# Patient Record
Sex: FEMALE | Race: WHITE | Employment: OTHER | URBAN - METROPOLITAN AREA
[De-identification: names, ages, dates, MRNs, and addresses within clinical notes are randomized per-mention and may not be internally consistent; named-entity substitution may affect disease eponyms.]

---

## 2017-02-06 ENCOUNTER — DOCUMENTATION ONLY (OUTPATIENT)
Dept: CARDIOLOGY | Facility: CLINIC | Age: 77
End: 2017-02-06

## 2017-02-10 ENCOUNTER — ALLIED HEALTH/NURSE VISIT (OUTPATIENT)
Dept: CARDIOLOGY | Facility: CLINIC | Age: 77
End: 2017-02-10
Payer: MEDICARE

## 2017-02-10 DIAGNOSIS — Z95.0 CARDIAC PACEMAKER IN SITU: Primary | ICD-10-CM

## 2017-02-10 PROCEDURE — 93296 REM INTERROG EVL PM/IDS: CPT | Performed by: INTERNAL MEDICINE

## 2017-02-10 PROCEDURE — 93294 REM INTERROG EVL PM/LDLS PM: CPT | Performed by: INTERNAL MEDICINE

## 2017-02-10 NOTE — PROGRESS NOTES
~ 90 day office teletrace ~  AP/VS at time of check. Magnet response WNL. F/U scheduled q 3 months. Ashlyn OVIEDO

## 2017-05-26 ENCOUNTER — ALLIED HEALTH/NURSE VISIT (OUTPATIENT)
Dept: CARDIOLOGY | Facility: CLINIC | Age: 77
End: 2017-05-26
Payer: MEDICARE

## 2017-05-26 DIAGNOSIS — Z95.0 CARDIAC PACEMAKER IN SITU: Primary | ICD-10-CM

## 2017-05-26 PROCEDURE — 93293 PM PHONE R-STRIP DEVICE EVAL: CPT | Performed by: INTERNAL MEDICINE

## 2017-05-26 NOTE — MR AVS SNAPSHOT
"              After Visit Summary   5/26/2017    Shadia Gil    MRN: [id number]           Patient Information     Date Of Birth          1940        Visit Information        Provider Department      5/26/2017 1:30 PM RODRIGUES TECH1 [facility]        Today's Diagnoses     Cardiac pacemaker in situ    -  1       Follow-ups after your visit        Your next 10 appointments already scheduled     Jun 01, 2017  2:45 PM CDT   Return Visit with Syeda Linda MD   [facility] ([facility])    [address] [place] [address] [address]  [facility] [id number] [phone]            Aug 31, 2017  1:30 PM CDT   Teletrace with RODRIGUES TECH2   [facility] ([facility])    [address] [place] [address] [address]  [facility] [id number] [phone]              Who to contact     If you have questions or need follow up information about today's clinic visit or your schedule please contact [facility] directly at [phone].  Normal or non-critical lab and imaging results will be communicated to you by Wanshenhart, letter or phone within 4 business days after the clinic has received the results. If you do not hear from us within 7 days, please contact the clinic through SkyGiraffet or phone. If you have a critical or abnormal lab result, we will notify you by phone as soon as possible.  Submit refill requests through ClassDojo or call your pharmacy and they will forward the refill request to us. Please allow 3 business days for your refill to be completed.          Additional Information About Your Visit        Wanshenhart Information     ClassDojo lets you send messages to your doctor, view your test results, renew your prescriptions, schedule appointments and more. To sign up, go to [email or web address] . Click on \"Log in\" on the left side of the screen, which will " "take you to the Welcome page. Then click on \"Sign up Now\" on the right side of the page.     You will be asked to enter the access code listed below, as well as some personal information. Please follow the directions to create your username and password.     Your access code is: [email or web address]  Expires: 2017  1:43 PM     Your access code will  in 90 days. If you need help or a new code, please call your [place] clinic or [phone].        Care EveryWhere ID     This is your Care EveryWhere ID. This could be used by other organizations to access your [place] medical records  [id number]         Blood Pressure from Last 3 Encounters:   16 132/70   16 136/62   16 128/84    Weight from Last 3 Encounters:   16 89.4 kg (197 lb)   16 90.6 kg (199 lb 12.8 oz)   03/10/16 93.8 kg (206 lb 12.8 oz)              We Performed the Following     PM PHONE R STRIP EVAL UP TO 90 DAYS ([id number])        Primary Care Provider Office Phone # Fax #    Adryan Martinez [phone]       [facility] [address] [place] [address]        Thank you!     Thank you for choosing [facility] [place]  for your care. Our goal is always to provide you with excellent care. Hearing back from our patients is one way we can continue to improve our services. Please take a few minutes to complete the written survey that you may receive in the mail after your visit with us. Thank you!             Your Updated Medication List - Protect others around you: Learn how to safely use, store and throw away your medicines at www.disposemymeds.org.          This list is accurate as of: 17  1:43 PM.  Always use your most recent med list.                   Brand Name Dispense Instructions for use    aspirin 81 MG tablet      Take 1 tablet by mouth daily.       cholecalciferol 1000 UNIT tablet    vitamin D    100 tablet    Take 1 tablet (1,000 Units) by " mouth daily       CoQ-10 200 MG Caps      Take 1 capsule by mouth daily Take at same time as statin cholesterol medications       lisinopril 10 MG tablet    PRINIVIL/ZESTRIL    180 tablet    Take 1 tablet (10 mg) by mouth 2 times daily       metoprolol 50 MG tablet    LOPRESSOR    180 tablet    Take 1 tablet (50 mg) by mouth 2 times daily       nitroglycerin 0.4 MG sublingual tablet    NITROSTAT    25 tablet    Place 1 tablet (0.4 mg) under the tongue every 5 minutes as needed for chest pain       pravastatin 80 MG tablet    PRAVACHOL    90 tablet    Take 1 tablet (80 mg) by mouth daily

## 2017-05-26 NOTE — PROGRESS NOTES
~ 90 day office teletrace ~  Appropriate AP/VS at time of check. Magnet response WNL. F/U scheduled q 3 months. Ashlyn OVIEDO

## 2017-09-08 ENCOUNTER — DOCUMENTATION ONLY (OUTPATIENT)
Dept: CARDIOLOGY | Facility: CLINIC | Age: 77
End: 2017-09-08

## 2017-09-08 NOTE — PROGRESS NOTES
Pt missed office teletrace on 8/31/17. Tried contacting pt via phone with no response. Mailed 1 week letter. Ashlyn OVIEDO

## 2017-10-07 DIAGNOSIS — I10 ESSENTIAL HYPERTENSION, BENIGN: ICD-10-CM

## 2017-10-07 DIAGNOSIS — I25.10 CORONARY ARTERY DISEASE INVOLVING NATIVE CORONARY ARTERY OF NATIVE HEART WITHOUT ANGINA PECTORIS: ICD-10-CM

## 2017-10-07 DIAGNOSIS — E78.5 HYPERLIPIDEMIA LDL GOAL <100: ICD-10-CM

## 2017-10-07 NOTE — TELEPHONE ENCOUNTER
metoprolol (LOPRESSOR) 50 MG tablet      Last Written Prescription Date: 9/28/16  Last Fill Quantity: 180, # refills: 3    Last Office Visit with [facility], [facility] or [facility] prescribing provider:  9/28/16   Future Office Visit:        BP Readings from Last 3 Encounters:   09/28/16 132/70   06/17/16 136/62   03/31/16 128/84         pravastatin (PRAVACHOL) 80 MG tablet     Last Written Prescription Date: 9/28/16  Last Fill Quantity: 90, # refills: 3  Last Office Visit with [facility], [facility] or [facility] prescribing provider: 9/28/16       Lab Results   Component Value Date    CHOL 157 09/21/2016     Lab Results   Component Value Date    HDL 60 09/21/2016     Lab Results   Component Value Date    LDL 78 09/21/2016     Lab Results   Component Value Date    TRIG 96 09/21/2016     Lab Results   Component Value Date    CHOLHDLRATIO 2.9 09/30/2015         lisinopril (PRINIVIL,ZESTRIL) 10 MG tablet      Last Written Prescription Date: 9/28/16  Last Fill Quantity: 180, # refills: 3  Last Office Visit with [facility], [facility] or [facility] prescribing provider: 9/28/16       Potassium   Date Value Ref Range Status   09/21/2016 4.3 3.4 - 5.3 mmol/L Final     Creatinine   Date Value Ref Range Status   09/21/2016 1.14 (H) 0.52 - 1.04 mg/dL Final     BP Readings from Last 3 Encounters:   09/28/16 132/70   06/17/16 136/62   03/31/16 128/84

## 2017-10-09 ENCOUNTER — RADIANT APPOINTMENT (OUTPATIENT)
Dept: MAMMOGRAPHY | Facility: CLINIC | Age: 77
End: 2017-10-09
Attending: INTERNAL MEDICINE
Payer: MEDICARE

## 2017-10-09 DIAGNOSIS — Z12.31 VISIT FOR SCREENING MAMMOGRAM: ICD-10-CM

## 2017-10-09 PROCEDURE — G0202 SCR MAMMO BI INCL CAD: HCPCS | Mod: TC

## 2017-10-10 RX ORDER — METOPROLOL TARTRATE 50 MG
TABLET ORAL
Qty: 60 TABLET | Refills: 0 | Status: SHIPPED | OUTPATIENT
Start: 2017-10-10 | End: 2017-11-09

## 2017-10-10 RX ORDER — LISINOPRIL 10 MG/1
TABLET ORAL
Qty: 60 TABLET | Refills: 0 | Status: SHIPPED | OUTPATIENT
Start: 2017-10-10 | End: 2017-11-09

## 2017-10-10 RX ORDER — PRAVASTATIN SODIUM 80 MG/1
TABLET ORAL
Qty: 30 TABLET | Refills: 0 | Status: SHIPPED | OUTPATIENT
Start: 2017-10-10 | End: 2017-11-22

## 2017-10-11 ENCOUNTER — OFFICE VISIT (OUTPATIENT)
Dept: URGENT CARE | Facility: URGENT CARE | Age: 77
End: 2017-10-11
Payer: MEDICARE

## 2017-10-11 VITALS
HEART RATE: 60 BPM | WEIGHT: 206.2 LBS | BODY MASS INDEX: 34.31 KG/M2 | OXYGEN SATURATION: 98 % | DIASTOLIC BLOOD PRESSURE: 77 MMHG | TEMPERATURE: 96.7 F | SYSTOLIC BLOOD PRESSURE: 140 MMHG

## 2017-10-11 DIAGNOSIS — R04.0 EPISTAXIS: Primary | ICD-10-CM

## 2017-10-11 PROCEDURE — 99213 OFFICE O/P EST LOW 20 MIN: CPT | Performed by: PHYSICIAN ASSISTANT

## 2017-10-11 NOTE — MR AVS SNAPSHOT
After Visit Summary   10/11/2017    Shadia Gil    MRN: [id number]           Patient Information     Date Of Birth          1940        Visit Information        Provider Department      10/11/2017 10:00 AM Sneha Tai PA-C [facility]        Today's Diagnoses     Epistaxis    -  1      Care Instructions    (R04.0) Epistaxis  (primary encounter diagnosis)  Comment: with underlying mild cold symptoms  Plan: sodium chloride (OCEAN) 0.65 % nasal spray        Use saline nasal spray intermittently throughout day to moisturize nasal passages.  Gently blow nose.      Use small amount of vaseline in nostrils at bedtime.     You may also try steam treatments: breathe in steam (from a safe distance) from boiling water for a few minutes.  Consider a humidifier for your home.     Follow up with primary clinic should your symptoms persist or worsen.              Follow-ups after your visit        Who to contact     If you have questions or need follow up information about today's clinic visit or your schedule please contact [facility] directly at [phone].  Normal or non-critical lab and imaging results will be communicated to you by imbookin (Pogby)hart, letter or phone within 4 business days after the clinic has received the results. If you do not hear from us within 7 days, please contact the clinic through Runnert or phone. If you have a critical or abnormal lab result, we will notify you by phone as soon as possible.  Submit refill requests through [employer] or call your pharmacy and they will forward the refill request to us. Please allow 3 business days for your refill to be completed.          Additional Information About Your Visit        imbookin (Pogby)hart Information     [employer] lets you send messages to your doctor, view your test results, renew your prescriptions, schedule appointments and more. To sign up, go to [email or web address] .  "Click on \"Log in\" on the left side of the screen, which will take you to the Welcome page. Then click on \"Sign up Now\" on the right side of the page.     You will be asked to enter the access code listed below, as well as some personal information. Please follow the directions to create your username and password.     Your access code is: [email or web address]  Expires: 2018 10:37 AM     Your access code will  in 90 days. If you need help or a new code, please call your [place] clinic or [phone].        Care EveryWhere ID     This is your Care EveryWhere ID. This could be used by other organizations to access your [place] medical records  [id number]        Your Vitals Were     Pulse Temperature Pulse Oximetry BMI (Body Mass Index)          60 96.7  F (35.9  C) (Oral) 98% 34.31 kg/m2         Blood Pressure from Last 3 Encounters:   10/11/17 140/77   16 132/70   16 136/62    Weight from Last 3 Encounters:   10/11/17 206 lb 3.2 oz (93.5 kg)   16 197 lb (89.4 kg)   16 199 lb 12.8 oz (90.6 kg)              Today, you had the following     No orders found for display         Today's Medication Changes          These changes are accurate as of: 10/11/17 10:37 AM.  If you have any questions, ask your nurse or doctor.               Start taking these medicines.        Dose/Directions    sodium chloride 0.65 % nasal spray   Commonly known as:  OCEAN   Used for:  Epistaxis   Started by:  Sneha Tai PA-C        Dose:  1 spray   Spray 1 spray into both nostrils daily as needed for congestion   Quantity:  1 Bottle   Refills:  0            Where to get your medicines      These medications were sent to [facility] [address] [address]     Phone:  [phone]     sodium chloride 0.65 % nasal spray                Primary Care Provider Office Phone # Fax #    Adryan Martinez [phone]    "    [address] [facility] [address]        Equal Access to Services     LILY SMALLS : Hadii aad ku hadrachaelneptali Somarcosali, waurmilada luqsarbjitha, qadakshata harpreetmikepineda hawkphilippineda, dorcas romeoepianne marie linares. So [facility] [phone].    ATENCIÓN: Si habla español, tiene a cavazos disposición servicios gratuitos de asistencia lingüística. Llame al [phone].    We comply with applicable federal civil rights laws and [place] laws. We do not discriminate on the basis of race, color, national origin, age, disability, sex, sexual orientation, or gender identity.            Thank you!     Thank you for choosing [facility]  for your care. Our goal is always to provide you with excellent care. Hearing back from our patients is one way we can continue to improve our services. Please take a few minutes to complete the written survey that you may receive in the mail after your visit with us. Thank you!             Your Updated Medication List - Protect others around you: Learn how to safely use, store and throw away your medicines at www.disposemymeds.org.          This list is accurate as of: 10/11/17 10:37 AM.  Always use your most recent med list.                   Brand Name Dispense Instructions for use Diagnosis    aspirin 81 MG tablet      Take 1 tablet by mouth daily.    CAD (coronary artery disease), Essential hypertension, benign       cholecalciferol 1000 UNIT tablet    vitamin D    100 tablet    Take 1 tablet (1,000 Units) by mouth daily    Vitamin D deficiency       CoQ-10 200 MG Caps      Take 1 capsule by mouth daily Take at same time as statin cholesterol medications    Hyperlipidemia LDL goal <100       lisinopril 10 MG tablet    PRINIVIL/ZESTRIL    60 tablet    TAKE ONE TABLET BY MOUTH TWICE A DAY    Essential hypertension, benign       metoprolol 50 MG tablet    LOPRESSOR    60 tablet    TAKE ONE TABLET BY MOUTH TWICE A DAY    Coronary artery disease involving native coronary artery  of native heart without angina pectoris, Essential hypertension, benign       nitroGLYcerin 0.4 MG sublingual tablet    NITROSTAT    25 tablet    Place 1 tablet (0.4 mg) under the tongue every 5 minutes as needed for chest pain    CAD (coronary artery disease)       pravastatin 80 MG tablet    PRAVACHOL    30 tablet    TAKE ONE TABLET BY MOUTH EVERY DAY    Coronary artery disease involving native coronary artery of native heart without angina pectoris, Hyperlipidemia LDL goal <100       sodium chloride 0.65 % nasal spray    OCEAN    1 Bottle    Spray 1 spray into both nostrils daily as needed for congestion    Epistaxis

## 2017-10-11 NOTE — PROGRESS NOTES
SUBJECTIVE:   Shadia Gil is a 77 year old female presenting with a chief complaint of   1) intermittent nasal bleeding for the past 2 weeks.    2) clear runny nose for 2 weeks.  Denies any sneezing or fevers.  Denies any cough.   Denies any fatigue or headaches.    Patient is otherwise in her usual state of health.     Past Medical History:   Diagnosis Date     CAD (coronary artery disease) 1/29/10    CABG x 4; LIMA to the LAD and vein graft to the OM, OM2 and RCA      Essential hypertension, benign      GERD (gastroesophageal reflux disease)      Hyperlipidemia LDL goal <100 7/11/11         Obesity (BMI 30-39.9) 2015    BMI 36.8     Vitamin D deficiency      Patient Active Problem List   Diagnosis     Advanced directives, counseling/discussion     Coronary artery disease involving native artery  (H)     Essential hypertension, benign     Hyperlipidemia LDL goal <100     GERD (gastroesophageal reflux disease)     Vitamin D deficiency     Bradycardia     Tachycardia-bradycardia (H)     Obesity (BMI 30-39.9)     Cholelithiasis     CAD (coronary artery disease)     Social History   Substance Use Topics     Smoking status: Never Smoker     Smokeless tobacco: Never Used     Alcohol use No       ROS:  CONSTITUTIONAL:NEGATIVE for fever, chills, change in weight  INTEGUMENTARY/SKIN: NEGATIVE for worrisome rashes, moles or lesions  ENT/MOUTH: as per HPI  RESP:NEGATIVE for significant cough or SOB  CV: NEGATIVE for chest pain, palpitations or peripheral edema  GI: NEGATIVE for nausea, abdominal pain, heartburn, or change in bowel habits  NEURO: NEGATIVE for weakness, dizziness or paresthesias    OBJECTIVE  :/77  Pulse 60  Temp 96.7  F (35.9  C) (Oral)  Wt 206 lb 3.2 oz (93.5 kg)  SpO2 98%  BMI 34.31 kg/m2  GENERAL APPEARANCE: healthy, alert and no distress  EYES: EOMI,  PERRL, conjunctiva clear  HENT: ear canals and TM's normal.  Nose with clear coryza and a few areas of scabbed blood on septum.   Mouth without ulcers, erythema or lesions  NECK: supple, nontender, no lymphadenopathy  NEURO: Normal strength and tone, sensory exam grossly normal,  normal speech and mentation  SKIN: no suspicious lesions or rashes    R04.0) Epistaxis  (primary encounter diagnosis)  Comment: with underlying mild cold symptoms  Plan: sodium chloride (OCEAN) 0.65 % nasal spray        Use saline nasal spray intermittently throughout day to moisturize nasal passages.  Gently blow nose.      Use small amount of vaseline in nostrils at bedtime.     You may also try steam treatments: breathe in steam (from a safe distance) from boiling water for a few minutes.  Consider a humidifier for your home.     Follow up with primary clinic should your symptoms persist or worsen.

## 2017-10-11 NOTE — NURSING NOTE
"Chief Complaint   Patient presents with     Nose Bleeds     Lt nose strill bleeding x about 2 weeks on and off       Initial /77  Pulse 60  Temp 96.7  F (35.9  C) (Oral)  Wt 206 lb 3.2 oz (93.5 kg)  SpO2 98%  BMI 34.31 kg/m2 Estimated body mass index is 34.31 kg/(m^2) as calculated from the following:    Height as of 9/28/16: 5' 5\" (1.651 m).    Weight as of this encounter: 206 lb 3.2 oz (93.5 kg).  Medication Reconciliation: complete    "

## 2017-10-11 NOTE — PATIENT INSTRUCTIONS
(R04.0) Epistaxis  (primary encounter diagnosis)  Comment: with underlying mild cold symptoms  Plan: sodium chloride (OCEAN) 0.65 % nasal spray        Use saline nasal spray intermittently throughout day to moisturize nasal passages.  Gently blow nose.      Use small amount of vaseline in nostrils at bedtime.     You may also try steam treatments: breathe in steam (from a safe distance) from boiling water for a few minutes.  Consider a humidifier for your home.     Follow up with primary clinic should your symptoms persist or worsen.

## 2017-10-16 ENCOUNTER — ALLIED HEALTH/NURSE VISIT (OUTPATIENT)
Dept: CARDIOLOGY | Facility: CLINIC | Age: 77
End: 2017-10-16
Payer: MEDICARE

## 2017-10-16 DIAGNOSIS — Z95.0 CARDIAC PACEMAKER IN SITU: Primary | ICD-10-CM

## 2017-10-16 PROCEDURE — 93296 REM INTERROG EVL PM/IDS: CPT | Performed by: INTERNAL MEDICINE

## 2017-10-16 PROCEDURE — 93294 REM INTERROG EVL PM/LDLS PM: CPT | Performed by: INTERNAL MEDICINE

## 2017-10-16 NOTE — PROGRESS NOTES
Medtronic Adapta (D) Remote PPM Device Check  AP: 91 % : <1 %  Mode: AAI<->DDD        Presenting Rhythm: AP/VS  Heart Rate: Rates mostly in the 60's per histogram  Sensing: Stable    Pacing Threshold: Stable    Impedance: Stable  Battery Status: 9.5-13.5 years  Atrial Arrhythmia: 8 brief mode switch episodes since last in office check on 10/17/16, no EGMs.  Ventricular Arrhythmia: 1 ventricular high rate ( dated on 12/20/16). EGM shows regular VS events with no onset or off set EGM, rates 170-185bpm. EF 55-60% (2010). Reviewed with BAHMAN Lowe.      Care Plan: F/u annual threshold in 3 months. Gave patient results over the phone. RanjitCVT

## 2017-10-16 NOTE — MR AVS SNAPSHOT
After Visit Summary   10/16/2017    Shadia Gil    MRN: [id number]           Patient Information     Date Of Birth          1940        Visit Information        Provider Department      10/16/2017 4:45 PM RODRIGUES TECH1 [facility]        Today's Diagnoses     Cardiac pacemaker in situ    -  1       Follow-ups after your visit        Your next 10 appointments already scheduled     Oct 16, 2017  4:45 PM CDT   Remote PPM Check with RODRIGUES TECH1   [facility] ([facility])    [address] [facility] [facility] [address] [address]  [facility] [id number] [phone]           This appointment is for a remote check of your pacemaker.  This is not an appointment at the office.            Ron 15, 2018  2:30 PM CST   Pacemaker Check with RODRIGUES DCRN   [facility] ([facility])    [address]  [facility] [id number] [phone]              Who to contact     If you have questions or need follow up information about today's clinic visit or your schedule please contact [facility] directly at [phone].  Normal or non-critical lab and imaging results will be communicated to you by LiveDatahart, letter or phone within 4 business days after the clinic has received the results. If you do not hear from us within 7 days, please contact the clinic through LiveDatahart or phone. If you have a critical or abnormal lab result, we will notify you by phone as soon as possible.  Submit refill requests through Niblitz or call your pharmacy and they will forward the refill request to us. Please allow 3 business days for your refill to be completed.          Additional Information About Your Visit        MyChart Information     N[employer]litz lets you send messages to your doctor, view your test results, renew your prescriptions, schedule appointments and more. To  "sign up, go to [email or web address] . Click on \"Log in\" on the left side of the screen, which will take you to the Welcome page. Then click on \"Sign up Now\" on the right side of the page.     You will be asked to enter the access code listed below, as well as some personal information. Please follow the directions to create your username and password.     Your access code is: [email or web address]  Expires: 2018 10:37 AM     Your access code will  in 90 days. If you need help or a new code, please call your [place] clinic or [phone].        Care EveryWhere ID     This is your Care EveryWhere ID. This could be used by other organizations to access your [place] medical records  [id number]         Blood Pressure from Last 3 Encounters:   10/11/17 140/77   16 132/70   16 136/62    Weight from Last 3 Encounters:   10/11/17 93.5 kg (206 lb 3.2 oz)   16 89.4 kg (197 lb)   16 90.6 kg (199 lb 12.8 oz)              We Performed the Following     INTERROGATION DEVICE EVAL REMOTE, PACER/ICD (55245)     PM DEVICE INTERROGATE REMOTE (72722)        Primary Care Provider Office Phone # Fax #    Adryan Martinez [phone]       [address] [place] [address]        Equal Access to Services     LILY SMALLS : Hadii aad ku hadasho Soomaali, waaxda luqadaha, qaybta kaalmada adeegyada, dorcas ceballos . So [facility] [phone].    ATENCIÓN: Si habla kimberlyañol, tiene a cavazos disposición servicios gratuitos de asistencia lingüística. [place] al [phone].    We comply with applicable federal civil rights laws and [place] laws. We do not discriminate on the basis of race, color, national origin, age, disability, sex, sexual orientation, or gender identity.            Thank you!     Thank you for choosing [facility] [place]  for your care. Our goal is always to provide you with excellent care. Hearing back from our " patients is one way we can continue to improve our services. Please take a few minutes to complete the written survey that you may receive in the mail after your visit with us. Thank you!             Your Updated Medication List - Protect others around you: Learn how to safely use, store and throw away your medicines at www.disposemymeds.org.          This list is accurate as of: 10/16/17  1:53 PM.  Always use your most recent med list.                   Brand Name Dispense Instructions for use Diagnosis    aspirin 81 MG tablet      Take 1 tablet by mouth daily.    CAD (coronary artery disease), Essential hypertension, benign       cholecalciferol 1000 UNIT tablet    vitamin D    100 tablet    Take 1 tablet (1,000 Units) by mouth daily    Vitamin D deficiency       CoQ-10 200 MG Caps      Take 1 capsule by mouth daily Take at same time as statin cholesterol medications    Hyperlipidemia LDL goal <100       lisinopril 10 MG tablet    PRINIVIL/ZESTRIL    60 tablet    TAKE ONE TABLET BY MOUTH TWICE A DAY    Essential hypertension, benign       metoprolol 50 MG tablet    LOPRESSOR    60 tablet    TAKE ONE TABLET BY MOUTH TWICE A DAY    Coronary artery disease involving native coronary artery of native heart without angina pectoris, Essential hypertension, benign       nitroGLYcerin 0.4 MG sublingual tablet    NITROSTAT    25 tablet    Place 1 tablet (0.4 mg) under the tongue every 5 minutes as needed for chest pain    CAD (coronary artery disease)       pravastatin 80 MG tablet    PRAVACHOL    30 tablet    TAKE ONE TABLET BY MOUTH EVERY DAY    Coronary artery disease involving native coronary artery of native heart without angina pectoris, Hyperlipidemia LDL goal <100       sodium chloride 0.65 % nasal spray    OCEAN    1 Bottle    Spray 1 spray into both nostrils daily as needed for congestion    Epistaxis

## 2017-11-09 DIAGNOSIS — I25.10 CORONARY ARTERY DISEASE INVOLVING NATIVE CORONARY ARTERY OF NATIVE HEART WITHOUT ANGINA PECTORIS: ICD-10-CM

## 2017-11-09 DIAGNOSIS — I10 ESSENTIAL HYPERTENSION, BENIGN: ICD-10-CM

## 2017-11-09 RX ORDER — METOPROLOL TARTRATE 50 MG
50 TABLET ORAL 2 TIMES DAILY
Qty: 60 TABLET | Refills: 0 | Status: SHIPPED | OUTPATIENT
Start: 2017-11-09 | End: 2017-11-22

## 2017-11-09 RX ORDER — LISINOPRIL 10 MG/1
10 TABLET ORAL 2 TIMES DAILY
Qty: 60 TABLET | Refills: 0 | Status: SHIPPED | OUTPATIENT
Start: 2017-11-09 | End: 2017-11-22

## 2017-11-09 NOTE — TELEPHONE ENCOUNTER
1 month prescription sent electronically to the specified pharmacy.  LAST REFILL WITHOUT AN APPOINTMENT     Last seen by me Sept 2016

## 2017-11-09 NOTE — TELEPHONE ENCOUNTER
Routing refill request to provider for review/approval because:  Rola given x1 and patient did not follow up, please advise  Patient needs to be seen because it has been more than 1 year since last office visit.

## 2017-11-22 ENCOUNTER — OFFICE VISIT (OUTPATIENT)
Dept: INTERNAL MEDICINE | Facility: CLINIC | Age: 77
End: 2017-11-22
Payer: MEDICARE

## 2017-11-22 VITALS
SYSTOLIC BLOOD PRESSURE: 132 MMHG | BODY MASS INDEX: 35.1 KG/M2 | OXYGEN SATURATION: 98 % | TEMPERATURE: 98 F | HEART RATE: 60 BPM | DIASTOLIC BLOOD PRESSURE: 68 MMHG | WEIGHT: 210.9 LBS

## 2017-11-22 DIAGNOSIS — I25.10 CORONARY ARTERY DISEASE INVOLVING NATIVE CORONARY ARTERY OF NATIVE HEART WITHOUT ANGINA PECTORIS: ICD-10-CM

## 2017-11-22 DIAGNOSIS — E66.01 SEVERE OBESITY (BMI 35.0-35.9 WITH COMORBIDITY) (H): ICD-10-CM

## 2017-11-22 DIAGNOSIS — E55.9 VITAMIN D DEFICIENCY: ICD-10-CM

## 2017-11-22 DIAGNOSIS — I10 ESSENTIAL HYPERTENSION, BENIGN: Primary | ICD-10-CM

## 2017-11-22 DIAGNOSIS — E78.5 HYPERLIPIDEMIA LDL GOAL <100: ICD-10-CM

## 2017-11-22 LAB
ALBUMIN SERPL-MCNC: 3.7 G/DL (ref 3.4–5)
ALP SERPL-CCNC: 68 U/L (ref 40–150)
ALT SERPL W P-5'-P-CCNC: 21 U/L (ref 0–50)
ANION GAP SERPL CALCULATED.3IONS-SCNC: 5 MMOL/L (ref 3–14)
AST SERPL W P-5'-P-CCNC: 20 U/L (ref 0–45)
BILIRUB SERPL-MCNC: 0.6 MG/DL (ref 0.2–1.3)
BUN SERPL-MCNC: 25 MG/DL (ref 7–30)
CALCIUM SERPL-MCNC: 9.6 MG/DL (ref 8.5–10.1)
CHLORIDE SERPL-SCNC: 107 MMOL/L (ref 94–109)
CHOLEST SERPL-MCNC: 184 MG/DL
CO2 SERPL-SCNC: 29 MMOL/L (ref 20–32)
CREAT SERPL-MCNC: 1.13 MG/DL (ref 0.52–1.04)
ERYTHROCYTE [DISTWIDTH] IN BLOOD BY AUTOMATED COUNT: 14.8 % (ref 10–15)
GFR SERPL CREATININE-BSD FRML MDRD: 46 ML/MIN/1.7M2
GLUCOSE SERPL-MCNC: 70 MG/DL (ref 70–99)
HCT VFR BLD AUTO: 44.4 % (ref 35–47)
HDLC SERPL-MCNC: 68 MG/DL
HGB BLD-MCNC: 13.7 G/DL (ref 11.7–15.7)
LDLC SERPL CALC-MCNC: 81 MG/DL
MCH RBC QN AUTO: 29.4 PG (ref 26.5–33)
MCHC RBC AUTO-ENTMCNC: 30.9 G/DL (ref 31.5–36.5)
MCV RBC AUTO: 95 FL (ref 78–100)
NONHDLC SERPL-MCNC: 116 MG/DL
PLATELET # BLD AUTO: 209 10E9/L (ref 150–450)
POTASSIUM SERPL-SCNC: 4.8 MMOL/L (ref 3.4–5.3)
PROT SERPL-MCNC: 7.8 G/DL (ref 6.8–8.8)
RBC # BLD AUTO: 4.66 10E12/L (ref 3.8–5.2)
SODIUM SERPL-SCNC: 141 MMOL/L (ref 133–144)
TRIGL SERPL-MCNC: 177 MG/DL
WBC # BLD AUTO: 9.7 10E9/L (ref 4–11)

## 2017-11-22 PROCEDURE — 85027 COMPLETE CBC AUTOMATED: CPT | Performed by: INTERNAL MEDICINE

## 2017-11-22 PROCEDURE — 99214 OFFICE O/P EST MOD 30 MIN: CPT | Performed by: INTERNAL MEDICINE

## 2017-11-22 PROCEDURE — 80053 COMPREHEN METABOLIC PANEL: CPT | Performed by: INTERNAL MEDICINE

## 2017-11-22 PROCEDURE — 36415 COLL VENOUS BLD VENIPUNCTURE: CPT | Performed by: INTERNAL MEDICINE

## 2017-11-22 PROCEDURE — 80061 LIPID PANEL: CPT | Performed by: INTERNAL MEDICINE

## 2017-11-22 RX ORDER — METOPROLOL TARTRATE 50 MG
50 TABLET ORAL 2 TIMES DAILY
Qty: 180 TABLET | Refills: 3 | Status: SHIPPED | OUTPATIENT
Start: 2017-11-22 | End: 2018-12-02

## 2017-11-22 RX ORDER — LISINOPRIL 10 MG/1
10 TABLET ORAL 2 TIMES DAILY
Qty: 180 TABLET | Refills: 3 | Status: SHIPPED | OUTPATIENT
Start: 2017-11-22 | End: 2018-12-02

## 2017-11-22 RX ORDER — PRAVASTATIN SODIUM 80 MG/1
80 TABLET ORAL DAILY
Qty: 90 TABLET | Refills: 3 | Status: SHIPPED | OUTPATIENT
Start: 2017-11-22 | End: 2018-12-02

## 2017-11-22 NOTE — PATIENT INSTRUCTIONS
"*  Continue all medications at the same doses.  Contact your usual pharmacy if you need refills.     *  Return to see me in 1 year, sooner if needed.  Call [phone] to schedule this appointment.    *  5 GOALS TO PREVENT VASCULAR DISEASE:     1.  Aggressive blood pressure control, under 130/80 ideally.  Using medications if needed.    Your blood pressure is under good control    BP Readings from Last 4 Encounters:   11/22/17 132/68   10/11/17 140/77   09/28/16 132/70   06/17/16 136/62       2.  Aggressive LDL cholesterol (\"bad cholesterol\") lowering as indicated.    Your goal is an LDL under 130 for sure, preferably under 100.  (If you have diabetes or previous vascular disease, the the LDL goals would be under 100 for sure, preferably under 70.)    New guidelines identify four high-risk groups who could benefit from statins:   *people with pre-existing heart disease, such as those who have had a heart attack;   *people ages 40 to 75 who have diabetes of any type  *patients ages 40 to 75 with at least a 7.5% risk of developing cardiovascular disease over the next decade, according to a formula described in the guidelines  *patients with the sort of super-high cholesterol that sometimes runs in families, as evidenced by an LDL of 190 milligrams per deciliter or higher    Your cholesterol levels are well controlled.    Recent Labs   Lab Test  09/21/16   0828  09/30/15   0910  09/16/14   0844   CHOL  157  177  167   HDL  60  62  49*   LDL  78  89  82   TRIG  96  128  182*   CHOLHDLRATIO   --   2.9  3.4       3.  Aggressive diabetic prevention, screening and/or management.      You do not have diabetes as of the most recent blood tests.     4.  No smoking    5.  Consider taking low dose aspirin (81 mg) tablet once per day over the age of 50, every day unless there is a specific reason that you cannot take aspirin (such as side effect, allergy, or you are on another \"blood thinner\").        --Based on your current " "cardiac risk factors, you should take Aspirin 81 mg once per day if you are over 50 years of age.              --Good Grains:  Oats, brown rice, Quinoa (these do not raise the blood sugar as much)     --Bad grains:  Anything made from wheat or white rice     (because these raise the blood sugars significantly, and the possible gluten issue from wheat for some people).      --Proteins:  Aim for \"lean proteins\" including chicken, fish, seafood, pork, turkey, and eggs (in moderation); Eat red meat only occasionally      "

## 2017-11-22 NOTE — LETTER
[facility]  [address]  [address]  [phone]        Shadiadonell Gil  [address]   [facility] [address]      11/22/2017        Dear Ms. Shadia Gil:    I am writing to inform you of the results of the laboratory tests you had done recently.    Total Cholesterol:   Lab Results   Component Value Date    CHOL 184 11/22/2017          (Recommended: below 200)        HDL (good) Cholesterol :   Lab Results   Component Value Date    HDL 68 11/22/2017         (Recommended: 40 or more)  LDL (bad) Cholesterol:    Lab Results   Component Value Date    LDL 81 11/22/2017          (Recommended: below 130, below 100 if heart disease or diabetes is diagnosed)   Triglycerides:    Lab Results   Component Value Date    TRIG 177 11/22/2017       (Recommended: below 180)  Total cholesterol/HDL (Cholesterol ratio:   Lab Results   Component Value Date    CHOLHDLRATIO 2.9 09/30/2015    NHDL 116 11/22/2017     (Recommended: below 5.0)    Slight variations and daily fluctuations are normal and should cause little concern. Overall patterns and values are most significant.  An elevated cholesterol is one factor in increasing your risk of heart and vascular disease.    Additional results of your recent labs are as noted.   Liver function: NORMAL  Kidney  function: NORMAL  Hemoglobin: NORMAL  Electrolytes: NORMAL  Glucose: NORMAL    Your labs looked good.  Continue all medications at the same doses.  Contact your usual pharmacy if you need refills.     Thank you for allowing me to participate in your care. If you have any further questions or problems, please contact me via our nurse line at [phone].    Sincerely,          Adryan Martinez MD  Department of Internal Medicine  [facility]

## 2017-11-22 NOTE — MR AVS SNAPSHOT
"              After Visit Summary   11/22/2017    Shadia Gil    MRN: [id number]           Patient Information     Date Of Birth          1940        Visit Information        Provider Department      11/22/2017 10:00 AM Adryan Martinez MD [facility]        Today's Diagnoses     Essential hypertension, benign    -  1    Coronary artery disease involving native coronary artery of native heart without angina pectoris        Obesity (BMI 35.0-35.9 with comorbidity) (H)        Hyperlipidemia LDL goal <100        Vitamin D deficiency          Care Instructions    *  Continue all medications at the same doses.  Contact your usual pharmacy if you need refills.     *  Return to see me in 1 year, sooner if needed.  Call [phone] to schedule this appointment.    *  5 GOALS TO PREVENT VASCULAR DISEASE:     1.  Aggressive blood pressure control, under 130/80 ideally.  Using medications if needed.    Your blood pressure is under good control    BP Readings from Last 4 Encounters:   11/22/17 132/68   10/11/17 140/77   09/28/16 132/70   06/17/16 136/62       2.  Aggressive LDL cholesterol (\"bad cholesterol\") lowering as indicated.    Your goal is an LDL under 130 for sure, preferably under 100.  (If you have diabetes or previous vascular disease, the the LDL goals would be under 100 for sure, preferably under 70.)    New guidelines identify four high-risk groups who could benefit from statins:   *people with pre-existing heart disease, such as those who have had a heart attack;   *people ages 40 to 75 who have diabetes of any type  *patients ages 40 to 75 with at least a 7.5% risk of developing cardiovascular disease over the next decade, according to a formula described in the guidelines  *patients with the sort of super-high cholesterol that sometimes runs in families, as evidenced by an LDL of 190 milligrams per deciliter or higher    Your cholesterol levels are well " "controlled.    Recent Labs   Lab Test  09/21/16   0828  09/30/15   0910  09/16/14   0844   CHOL  157  177  167   HDL  60  62  49*   LDL  78  89  82   TRIG  96  128  182*   CHOLHDLRATIO   --   2.9  3.4       3.  Aggressive diabetic prevention, screening and/or management.      You do not have diabetes as of the most recent blood tests.     4.  No smoking    5.  Consider taking low dose aspirin (81 mg) tablet once per day over the age of 50, every day unless there is a specific reason that you cannot take aspirin (such as side effect, allergy, or you are on another \"blood thinner\").        --Based on your current cardiac risk factors, you should take Aspirin 81 mg once per day if you are over 50 years of age.              --Good Grains:  Oats, brown rice, Quinoa (these do not raise the blood sugar as much)     --Bad grains:  Anything made from wheat or white rice     (because these raise the blood sugars significantly, and the possible gluten issue from wheat for some people).      --Proteins:  Aim for \"lean proteins\" including chicken, fish, seafood, pork, turkey, and eggs (in moderation); Eat red meat only occasionally              Follow-ups after your visit        Your next 10 appointments already scheduled     Ron 15, 2018  2:30 PM CST   Pacemaker Check with LILIA VARGAS   [facility] ([facility])    [address] [place] [id number] [phone]              Who to contact     If you have questions or need follow up information about today's clinic visit or your schedule please contact [facility] directly at [phone].  Normal or non-critical lab and imaging results will be communicated to you by MyChart, letter or phone within 4 business days after the clinic has received the results. If you do not hear from us within 7 days, please contact the clinic through MyChart or phone. If you have a critical or abnormal lab " "result, we will notify you by phone as soon as possible.  Submit refill requests through App[employer] or call your pharmacy and they will forward the refill request to us. Please allow 3 business days for your refill to be completed.          Additional Information About Your Visit        DVDPlayhart Information     AppDirect lets you send messages to your doctor, view your test results, renew your prescriptions, schedule appointments and more. To sign up, go to [email or web address] . Click on \"Log in\" on the left side of the screen, which will take you to the Welcome page. Then click on \"Sign up Now\" on the right side of the page.     You will be asked to enter the access code listed below, as well as some personal information. Please follow the directions to create your username and password.     Your access code is: [email or web address]  Expires: 2018  9:37 AM     Your access code will  in 90 days. If you need help or a new code, please call your [place] clinic or [phone].        Care EveryWhere ID     This is your Care EveryWhere ID. This could be used by other organizations to access your [place] medical records  [id number]        Your Vitals Were     Pulse Temperature Pulse Oximetry BMI (Body Mass Index)          60 98  F (36.7  C) (Oral) 98% 35.1 kg/m2         Blood Pressure from Last 3 Encounters:   17 132/68   10/11/17 140/77   16 132/70    Weight from Last 3 Encounters:   17 210 lb 14.4 oz (95.7 kg)   10/11/17 206 lb 3.2 oz (93.5 kg)   16 197 lb (89.4 kg)              We Performed the Following     CBC with platelets     Comprehensive metabolic panel     Lipid panel reflex to direct LDL          Today's Medication Changes          These changes are accurate as of: 17 10:37 AM.  If you have any questions, ask your nurse or doctor.               These medicines have changed or have updated prescriptions.        Dose/Directions    lisinopril 10 MG tablet   Commonly known as:  " PRINIVIL/ZESTRIL   This may have changed:  additional instructions   Used for:  Essential hypertension, benign   Changed by:  Adryan Martinez MD        Dose:  10 mg   Take 1 tablet (10 mg) by mouth 2 times daily   Quantity:  180 tablet   Refills:  3       metoprolol 50 MG tablet   Commonly known as:  LOPRESSOR   This may have changed:  additional instructions   Used for:  Coronary artery disease involving native coronary artery of native heart without angina pectoris, Essential hypertension, benign   Changed by:  Adryan Martinez MD        Dose:  50 mg   Take 1 tablet (50 mg) by mouth 2 times daily   Quantity:  180 tablet   Refills:  3       pravastatin 80 MG tablet   Commonly known as:  PRAVACHOL   This may have changed:  See the new instructions.   Used for:  Coronary artery disease involving native coronary artery of native heart without angina pectoris, Hyperlipidemia LDL goal <100   Changed by:  Adryan Martinez MD        Dose:  80 mg   Take 1 tablet (80 mg) by mouth daily   Quantity:  90 tablet   Refills:  3            Where to get your medicines      These medications were sent to [facility] [address] [address]     Phone:  [phone]     lisinopril 10 MG tablet    metoprolol 50 MG tablet    pravastatin 80 MG tablet                Primary Care Provider Office Phone # Fax #    Adryan Martinez [phone]       [address] [place] [address]        Equal Access to Services     LILY SMALLS : Hadii gertrudis ku hadasho Soomaali, waaxda luqadaha, qaybta kaalmada adeegyada, dorcas linares. So [facility] [phone].    ATENCIÓN: Si habla español, tiene a cavazos disposición servicios gratuitos de asistencia lingüística. [place] al [phone].    We comply with applicable federal civil rights laws and [place] laws. We do not discriminate on the basis of race, color, national  origin, age, disability, sex, sexual orientation, or gender identity.            Thank you!     Thank you for choosing [facility]  for your care. Our goal is always to provide you with excellent care. Hearing back from our patients is one way we can continue to improve our services. Please take a few minutes to complete the written survey that you may receive in the mail after your visit with us. Thank you!             Your Updated Medication List - Protect others around you: Learn how to safely use, store and throw away your medicines at www.disposemymeds.org.          This list is accurate as of: 11/22/17 10:37 AM.  Always use your most recent med list.                   Brand Name Dispense Instructions for use Diagnosis    aspirin 81 MG tablet      Take 1 tablet by mouth daily.    CAD (coronary artery disease), Essential hypertension, benign       cholecalciferol 1000 UNIT tablet    vitamin D3     Take 1 tablet (1,000 Units) by mouth daily    Vitamin D deficiency       CoQ-10 200 MG Caps      Take 1 capsule by mouth daily Take at same time as statin cholesterol medications    Hyperlipidemia LDL goal <100       lisinopril 10 MG tablet    PRINIVIL/ZESTRIL    180 tablet    Take 1 tablet (10 mg) by mouth 2 times daily    Essential hypertension, benign       metoprolol 50 MG tablet    LOPRESSOR    180 tablet    Take 1 tablet (50 mg) by mouth 2 times daily    Coronary artery disease involving native coronary artery of native heart without angina pectoris, Essential hypertension, benign       nitroGLYcerin 0.4 MG sublingual tablet    NITROSTAT    25 tablet    Place 1 tablet (0.4 mg) under the tongue every 5 minutes as needed for chest pain    CAD (coronary artery disease)       pravastatin 80 MG tablet    PRAVACHOL    90 tablet    Take 1 tablet (80 mg) by mouth daily    Coronary artery disease involving native coronary artery of native heart without angina pectoris, Hyperlipidemia LDL goal <100        sodium chloride 0.65 % nasal spray    OCEAN    1 Bottle    Spray 1 spray into both nostrils daily as needed for congestion    Epistaxis

## 2017-11-22 NOTE — NURSING NOTE
"Chief Complaint   Patient presents with     Hypertension     med refill       Initial /68  Pulse 60  Temp 98  F (36.7  C) (Oral)  Wt 210 lb 14.4 oz (95.7 kg)  SpO2 98%  BMI 35.1 kg/m2 Estimated body mass index is 35.1 kg/(m^2) as calculated from the following:    Height as of 9/28/16: 5' 5\" (1.651 m).    Weight as of this encounter: 210 lb 14.4 oz (95.7 kg).  Medication Reconciliation: complete   Hannah Cross CMA      "

## 2017-11-22 NOTE — PROGRESS NOTES
SUBJECTIVE:   Shadia Gil is a 77 year old female who presents to clinic today for the following health issues:    1.  Hypertension Follow-up    Outpatient blood pressures are not being checked.    Low Salt Diet: not monitoring salt      Amount of exercise or physical activity: occ walks    Problems taking medications regularly: No    Medication side effects: none    Diet: Regular      2.  Prior of coronary artery disease as listed in medical history.  No current or recent cardiovascaulr symptoms.   No shortness of breath, no episodes of chest pain/pressure, no dyspnea on exertion, no changes in his abiliy to perform physical exertion or tasks.  Takes the same amount of time to perform similar physical tasks.      3.  Has history of hyperlipidemia.  On statin for this, denies any significant side effects of this medication.      Latest labs reviewed:    Recent Labs   Lab Test  09/21/16   0828  09/30/15   0910  09/16/14   0844   CHOL  157  177  167   HDL  60  62  49*   LDL  78  89  82   TRIG  96  128  182*   CHOLHDLRATIO   --   2.9  3.4        Lab Results   Component Value Date    AST 15 09/21/2016         Problem list and histories reviewed & adjusted, as indicated.  Additional history: as documented        Reviewed and updated as needed this visit by clinical staffTobacco  Allergies       Reviewed and updated as needed this visit by Provider           Past Medical History:  ---------------------------  Past Medical History:   Diagnosis Date     CAD (coronary artery disease) 1/29/10    CABG x 4; LIMA to the LAD and vein graft to the OM, OM2 and RCA      Essential hypertension, benign      GERD (gastroesophageal reflux disease)      Hyperlipidemia LDL goal <100 7/11/11         Obesity (BMI 30-39.9) 2015    BMI 36.8     Vitamin D deficiency        Past Surgical History:  ---------------------------  Past Surgical History:   Procedure Laterality Date     CORONARY ARTERY BYPASS  1/29/10    4 vessel  "CABG at [facility]; Four-vessel coronary artery bypass, beating heart technique.  Endoscopic vein harvest using the saphenous vein for the distal right, saphenous vein for the first and second marginal branches of the circumflex, left internal mammary artery for the LAD     IMPLANT PACEMAKER  11/2012    Dual Chamber     LAPAROSCOPIC CHOLECYSTECTOMY N/A 2/2/2016    Procedure: LAPAROSCOPIC CHOLECYSTECTOMY;  Surgeon: Maurilio Burger MD;  Location: [facility] OR       Current Medications:  ---------------------------  Current Outpatient Prescriptions   Medication Sig Dispense Refill     lisinopril (PRINIVIL/ZESTRIL) 10 MG tablet Take 1 tablet (10 mg) by mouth 2 times daily LAST REFILL WITHOUT AN APPOINTMENT 60 tablet 0     metoprolol (LOPRESSOR) 50 MG tablet Take 1 tablet (50 mg) by mouth 2 times daily LAST REFILL WITHOUT AN APPOINTMENT 60 tablet 0     sodium chloride (OCEAN) 0.65 % nasal spray Spray 1 spray into both nostrils daily as needed for congestion 1 Bottle 0     pravastatin (PRAVACHOL) 80 MG tablet TAKE ONE TABLET BY MOUTH EVERY DAY 30 tablet 0     Coenzyme Q10 (COQ-10) 200 MG CAPS Take 1 capsule by mouth daily Take at same time as statin cholesterol medications       cholecalciferol (VITAMIN D) 1000 UNIT tablet Take 1 tablet (1,000 Units) by mouth daily 100 tablet 3     nitroglycerin (NITROSTAT) 0.4 MG SL tablet Place 1 tablet (0.4 mg) under the tongue every 5 minutes as needed for chest pain 25 tablet 0     aspirin 81 MG tablet Take 1 tablet by mouth daily.         Allergies:  -------------  Allergies   Allergen Reactions     Atorvastatin Other (See Comments)     Myalgias, dizziness     Codeine      States \"there was only a question about it\"     Penicillins      States,\" there is a question about it\"       Social History:  -------------------  Social History     Social History     Marital status:      Spouse name: N/A     Number of children: N/A     Years of education: N/A     Occupational History     Not on " file.     Social History Main Topics     Smoking status: Never Smoker     Smokeless tobacco: Never Used     Alcohol use No     Drug use: No     Sexual activity: Not Currently     Other Topics Concern     Caffeine Concern Yes     tea occasional      Exercise No     Social History Narrative       Family Medical History:  ------------------------------  Family History   Problem Relation Age of Onset     CANCER Mother      HEART DISEASE Brother      LINDSAY. Brother          ROS:  REVIEW OF SYSTEMS:    RESP: negative for cough, dyspnea, wheezing, hemoptysis  CV: negative for chest pain, palpitations, PND, CAVAZOS, orthopnea; reports no changes in their ability to perform physical activity (from cardiovascular standpoint)  GI: negative for dysphagia, N/V, pain, melena, diarrhea and constipation  NEURO: negative for numbness/tingling, paralysis, incoordination, or focal weakness     OBJECTIVE:                                                    /68  Pulse 60  Temp 98  F (36.7  C) (Oral)  Wt 210 lb 14.4 oz (95.7 kg)  SpO2 98%  BMI 35.1 kg/m2     GENERAL alert and no distress  EYES:  Normal sclera,conjunctiva, EOMI  HENT: oral and posterior pharynx without lesions or erythema, facies symmetric  NECK: Neck supple. No LAD, without thyroidmegaly or JVD., Carotids without bruits.  RESP: Clear to ausculation bilaterally without wheezes or crackles. Normal BS in all fields.  CV: RRR normal S1S2 without murmurs, rubs or gallops. PMI normal  LYMPH: no cervical lymph adenopathy appreciated  MS: extremities- no gross deformities of the visible extremities noted, no edema  PSYCH: Alert and oriented times 3; speech- coherent  SKIN:  No obvious significant skin lesions on visible portions of face          ASSESSMENT/PLAN:                                                      (I10) Essential hypertension, benign  (primary encounter diagnosis)  Comment: This condition is currently controlled on the current medical regimen.  Continue  current therapy.   Discussed hypertension in detail including JNC VIII guidelines for blood pressure goals.  Discussed indication for treatment and treatment options.  Discussed the importance for aggressive management of HTN to prevent vascular complications later.  Recommended lower fat, lower carbohydrate, and lower sodium (<2000 mg)diet.  Discussed required intervals for follow up on HTN, lab studies, and the need to aggresive management of other cardiac disease risk factors.  Recommened pt. follow their blood pressures outside the clinic to ensure that BPs are remaining within guidelines, and to contact me if the readings are not within guidelines so we can adjust treatment as needed.   Plan: lisinopril (PRINIVIL/ZESTRIL) 10 MG tablet,         metoprolol (LOPRESSOR) 50 MG tablet            (I25.10) Coronary artery disease involving native coronary artery of native heart without angina pectoris  Comment: The patient does not report any signs or symptoms of angina or active cardiac ischemia.   They do not report any relative changes in their ability to perform physical activities over the past year.    We discussed aggressive secondary risk factor modification, including aggressive BP control (under 130/ideally), aggressive LDL lowering with statin (goal under 100 for sure/under 70 ideally), no smoking, diabetes prevention/management, no smoking, and use of either ASA or similar anti platelet agent if tolerated.     Plan: pravastatin (PRAVACHOL) 80 MG tablet,         metoprolol (LOPRESSOR) 50 MG tablet            (E66.01,  Z68.35) Obesity (BMI 35.0-35.9 with comorbidity) (H)  Comment: The patient's current BMI is: Body mass index is 35.1 kg/(m^2).  Obesity is a biological preventable and treatable disease, which is associated with significantly increased risk of many acute and chronic health conditions. Obesity has now been recognized as a chronic disease by the American Medical Association.  A range of serious  co-morbidities are associated with obesity including increased risk for hypertension, stroke, coronary artery disease, dyslipidemia, Type II diabetes, depression, sleep apnea, cancers of the colon, breast and endometrium, obstructive sleep apnea, osteoarthritis and female infertility. Therefore, obesity is not just a problem; it s a disease that warrants serious evidence based treatements.  Recommended regular aerobic exercise, recommended improved diet aiming at lowering amount of processed foods, lower sugars, and lower wheat products, and moderation carbs and fat in the diet, establishing more regular meal times, always eating breakfast, front loading some of the calories and adding more protein to the diet.    Discussed the increased risks of developing or worsening all types of diabetes and other dysmetabolic syndromes.    Surgical therapy may be considered, especially in patients with BMI greater than or equal to 35 kg/m2 with multiple complications. Surgical treatments include lap-band, gastric sleeve or gastric bypass surgery.     Plan:     (E78.5) Hyperlipidemia LDL goal <100  Comment: Discussed new guidelines recommending a statin cholesterol lowering medication for any patient with either diabetes and/or vascular disease, aiming for a LDL goal under 100 for sure, ideally under 70.    Reviewed statins and their side effects including muscle pain, muscle inflammation, GI upset.  Told the patient to stop the medication in question and to call if any side effects develop.   Recommended CoQ10 200-300 mg at the same time as taking the statin medication to help reduce the chance of muscle side effects from the statin.    Plan: pravastatin (PRAVACHOL) 80 MG tablet            (E55.9) Vitamin D deficiency  Comment:   Plan: cholecalciferol (VITAMIN D3) 1000 UNIT tablet               See Patient Instructions    ISADORA KELLEY M.D., MD  [facility]

## 2018-01-26 ENCOUNTER — HOSPITAL ENCOUNTER (EMERGENCY)
Facility: CLINIC | Age: 78
Discharge: HOME | End: 2018-01-26
Attending: EMERGENCY MEDICINE | Admitting: EMERGENCY MEDICINE
Payer: MEDICARE

## 2018-01-26 ENCOUNTER — APPOINTMENT (OUTPATIENT)
Dept: GENERAL RADIOLOGY | Facility: CLINIC | Age: 78
End: 2018-01-26
Attending: EMERGENCY MEDICINE
Payer: MEDICARE

## 2018-01-26 VITALS
HEIGHT: 63 IN | BODY MASS INDEX: 37.56 KG/M2 | DIASTOLIC BLOOD PRESSURE: 62 MMHG | TEMPERATURE: 99.7 F | WEIGHT: 212 LBS | HEART RATE: 138 BPM | OXYGEN SATURATION: 95 % | SYSTOLIC BLOOD PRESSURE: 131 MMHG | RESPIRATION RATE: 16 BRPM

## 2018-01-26 DIAGNOSIS — E86.0 DEHYDRATION: ICD-10-CM

## 2018-01-26 DIAGNOSIS — B34.9 VIRAL INFECTION: ICD-10-CM

## 2018-01-26 LAB
ALBUMIN SERPL-MCNC: 3.7 G/DL (ref 3.4–5)
ALP SERPL-CCNC: 50 U/L (ref 40–150)
ALT SERPL W P-5'-P-CCNC: 22 U/L (ref 0–50)
ANION GAP SERPL CALCULATED.3IONS-SCNC: 10 MMOL/L (ref 3–14)
AST SERPL W P-5'-P-CCNC: 29 U/L (ref 0–45)
BASOPHILS # BLD AUTO: 0 10E9/L (ref 0–0.2)
BASOPHILS NFR BLD AUTO: 0.1 %
BILIRUB SERPL-MCNC: 1 MG/DL (ref 0.2–1.3)
BUN SERPL-MCNC: 31 MG/DL (ref 7–30)
CALCIUM SERPL-MCNC: 8.7 MG/DL (ref 8.5–10.1)
CHLORIDE SERPL-SCNC: 103 MMOL/L (ref 94–109)
CO2 SERPL-SCNC: 23 MMOL/L (ref 20–32)
CREAT SERPL-MCNC: 1.18 MG/DL (ref 0.52–1.04)
DIFFERENTIAL METHOD BLD: ABNORMAL
EOSINOPHIL # BLD AUTO: 0 10E9/L (ref 0–0.7)
EOSINOPHIL NFR BLD AUTO: 0.1 %
ERYTHROCYTE [DISTWIDTH] IN BLOOD BY AUTOMATED COUNT: 14.4 % (ref 10–15)
FLUAV+FLUBV AG SPEC QL: NEGATIVE
FLUAV+FLUBV AG SPEC QL: NEGATIVE
GFR SERPL CREATININE-BSD FRML MDRD: 44 ML/MIN/1.7M2
GLUCOSE SERPL-MCNC: 144 MG/DL (ref 70–99)
HCT VFR BLD AUTO: 44.1 % (ref 35–47)
HGB BLD-MCNC: 15 G/DL (ref 11.7–15.7)
IMM GRANULOCYTES # BLD: 0 10E9/L (ref 0–0.4)
IMM GRANULOCYTES NFR BLD: 0.1 %
LYMPHOCYTES # BLD AUTO: 0.4 10E9/L (ref 0.8–5.3)
LYMPHOCYTES NFR BLD AUTO: 5.5 %
MCH RBC QN AUTO: 30.3 PG (ref 26.5–33)
MCHC RBC AUTO-ENTMCNC: 34 G/DL (ref 31.5–36.5)
MCV RBC AUTO: 89 FL (ref 78–100)
MONOCYTES # BLD AUTO: 0.4 10E9/L (ref 0–1.3)
MONOCYTES NFR BLD AUTO: 6.5 %
NEUTROPHILS # BLD AUTO: 5.9 10E9/L (ref 1.6–8.3)
NEUTROPHILS NFR BLD AUTO: 87.7 %
NRBC # BLD AUTO: 0 10*3/UL
NRBC BLD AUTO-RTO: 0 /100
PLATELET # BLD AUTO: 176 10E9/L (ref 150–450)
POTASSIUM SERPL-SCNC: 4.6 MMOL/L (ref 3.4–5.3)
PROT SERPL-MCNC: 7.4 G/DL (ref 6.8–8.8)
RBC # BLD AUTO: 4.95 10E12/L (ref 3.8–5.2)
SODIUM SERPL-SCNC: 136 MMOL/L (ref 133–144)
SPECIMEN SOURCE: NORMAL
WBC # BLD AUTO: 6.7 10E9/L (ref 4–11)

## 2018-01-26 PROCEDURE — 96361 HYDRATE IV INFUSION ADD-ON: CPT

## 2018-01-26 PROCEDURE — 25800025 ZZH RX 258: Performed by: EMERGENCY MEDICINE

## 2018-01-26 PROCEDURE — 71046 X-RAY EXAM CHEST 2 VIEWS: CPT

## 2018-01-26 PROCEDURE — 96374 THER/PROPH/DIAG INJ IV PUSH: CPT

## 2018-01-26 PROCEDURE — 80053 COMPREHEN METABOLIC PANEL: CPT | Performed by: EMERGENCY MEDICINE

## 2018-01-26 PROCEDURE — 25000128 H RX IP 250 OP 636: Performed by: EMERGENCY MEDICINE

## 2018-01-26 PROCEDURE — 87804 INFLUENZA ASSAY W/OPTIC: CPT | Performed by: EMERGENCY MEDICINE

## 2018-01-26 PROCEDURE — 85025 COMPLETE CBC W/AUTO DIFF WBC: CPT | Performed by: EMERGENCY MEDICINE

## 2018-01-26 PROCEDURE — 99284 EMERGENCY DEPT VISIT MOD MDM: CPT | Mod: 25

## 2018-01-26 RX ORDER — ONDANSETRON 2 MG/ML
4 INJECTION INTRAMUSCULAR; INTRAVENOUS ONCE
Status: COMPLETED | OUTPATIENT
Start: 2018-01-26 | End: 2018-01-26

## 2018-01-26 RX ORDER — ONDANSETRON 4 MG/1
4 TABLET, ORALLY DISINTEGRATING ORAL EVERY 8 HOURS PRN
Qty: 10 TABLET | Refills: 0 | Status: SHIPPED | OUTPATIENT
Start: 2018-01-26 | End: 2018-01-29

## 2018-01-26 RX ORDER — DEXTROSE, SODIUM CHLORIDE, SODIUM LACTATE, POTASSIUM CHLORIDE, AND CALCIUM CHLORIDE 5; .6; .31; .03; .02 G/100ML; G/100ML; G/100ML; G/100ML; G/100ML
1000 INJECTION, SOLUTION INTRAVENOUS ONCE
Status: DISCONTINUED | OUTPATIENT
Start: 2018-01-26 | End: 2018-01-26 | Stop reason: RX

## 2018-01-26 RX ORDER — ONDANSETRON 2 MG/ML
4 INJECTION INTRAMUSCULAR; INTRAVENOUS ONCE
Status: DISCONTINUED | OUTPATIENT
Start: 2018-01-26 | End: 2018-01-26 | Stop reason: HOSPADM

## 2018-01-26 RX ADMIN — ONDANSETRON 4 MG: 2 INJECTION INTRAMUSCULAR; INTRAVENOUS at 20:01

## 2018-01-26 RX ADMIN — SODIUM CHLORIDE 1000 ML: 9 INJECTION, SOLUTION INTRAVENOUS at 18:03

## 2018-01-26 RX ADMIN — DEXTROSE AND SODIUM CHLORIDE 500 ML: 5; 450 INJECTION, SOLUTION INTRAVENOUS at 20:01

## 2018-01-26 ASSESSMENT — ENCOUNTER SYMPTOMS
NAUSEA: 1
NECK PAIN: 1
VOMITING: 0
DIARRHEA: 0
MYALGIAS: 1
COUGH: 1
HEADACHES: 1
DYSURIA: 0

## 2018-01-26 NOTE — ED PROVIDER NOTES
"  History     Chief Complaint:  Flu Symptoms    HPI   Shadia Gil is a 78 year old female who presents to the emergency department for evaluation of flu symptoms. The patient reports nausea since last night, 01/25/18, which has continued this morning and throughout the day. She reports having a few loose bowel movements which seemed to improve her nausea, but denies diarrhea. She also complains of myalgias in her shoulders, knees, and neck, as well as headache and coughing up some white phlegm today. The patient denies any actual vomiting, dysuria, or any ill contacts with influenza.     Allergies:  Atorvastatin  Codeine  Penicillins     Medications:    Pravachol  Lisinopril  Lopressor  Ocean  COQ-10  Nitrostat  Aspirin    Past Medical History:    CAD (coronary artery disease)   Essential hypertension, benign   GERD (gastroesophageal reflux disease)   Hyperlipidemia LDL goal <100   Obesity (BMI 30-39.9)   Vitamin D deficiency    Past Surgical History:    Coronary artery bypass  Implant pacemaker  Laparoscopic cholecystectomy    Family History:    Cancer  Heart Disease  C.A.D.    Social History:  Smoking Status: Never Smoker  Smokeless Tobacco: Never Used  Alcohol Use: No  Marital Status:      Review of Systems   Respiratory: Positive for cough.    Gastrointestinal: Positive for nausea. Negative for diarrhea and vomiting.   Genitourinary: Negative for dysuria.   Musculoskeletal: Positive for myalgias and neck pain.   Neurological: Positive for headaches.   All other systems reviewed and are negative.    Physical Exam     Patient Vitals for the past 24 hrs:   BP Temp Temp src Pulse Resp SpO2 Height Weight   01/26/18 2000 - - - - - 95 % - -   01/26/18 1959 131/62 - - - - - - -   01/26/18 1634 101/64 99.7  F (37.6  C) Oral 138 16 96 % 1.6 m (5' 3\") 96.2 kg (212 lb)     Physical Exam  General: Resting comfortably on the gurney  Head:  The scalp, face, and head appear normal  Eyes:  The pupils are equal, " round, and reactive to light    There is no nystagmus    Extraocular muscles are intact    Conjunctivae and sclerae are normal  ENT:    The nose is normal    Pinnae are normal    The oropharynx is normal    Uvula is in the midline  Neck:  Normal range of motion    There is no rigidity noted    There is no midline cervical spine pain/tenderness    Trachea is in the midline    No mass is detected  CV:  Regular rate and underlying rhythm     Normal S1/S2, no S3/S4    No pathological murmur detected  Resp:  Lungs are clear    There is no tachypnea    Non-labored    No rales    No wheezing   GI:  Abdomen is soft, there is no rigidity    No distension    No tympani    No rebound tenderness     Non-surgical without peritoneal features  MS:  Normal muscular tone    Symmetric motor strength    No major joint effusions    No asymmetric leg swelling, no calf tenderness  Skin:  No rash or acute skin lesions noted  Neuro: Speech is normal and fluent  Psych:  Awake. Alert.      Normal affect.  Appropriate interactions.  Lymph: No anterior cervical lymphadenopathy noted    Emergency Department Course     Imaging:  Radiology findings were communicated with the patient who voiced understanding of the findings.    XR Chest 2 Views  No active disease. Previous midline sternotomy and  transvenous cardiac device again noted. Degenerative changes seen in  Spine.  Reading per radiology.     Laboratory:  Laboratory findings were communicated with the patient who voiced understanding of the findings.    Influenza A/B antigen: Influenza A Negative, Influenza B Negative  CBC: WBC 6.7, HGB 15.0,   CMP: Glucose 144 (H), BUN 31 (H), Creatinine 1.18 (H), GFR Estimate 44 (L) o/w WNL    Interventions:  1803 NS Bolus IV   2001 Dextrose 5% 500 mL IV   2001 Zofran 4 mg IV     Emergency Department Course:     Nursing notes and vitals reviewed.     I performed an exam of the patient as documented above.     IV was inserted and blood was drawn for  laboratory testing, results above.     A nasopharyngeal sample was obtained from the patient and sent for influenza testing.    The patient was sent for a chest x-ray while in the emergency department, results above.    2047 I checked on the patient. She is feeling much better.    I personally reviewed the laboratory and imaging results with the patient and answered all related questions prior to discharge.    Impression & Plan      Medical Decision Making:  Shadia Gil is a 78 year old female who presents to the emergency department today with  Symptoms as noted above.  Patient has had some episodes of nausea loose stools decreased appetite and mild cough.  Clinical examination is nonacute.Patient had screening laboratories which show a normal influenza testing normal white count, and electrolytes reveal prerenal azotemia likely secondary to dehydration.  Patient's chest x-ray is nonacute.  Patient underwent hydration in the emergency department was given anti-emetics she was feeling much better.  This likely represents a viral etiology.  No life-threatening etiologies were detected in the emergency department.    Diagnosis:    ICD-10-CM    1. Dehydration E86.0    2. Viral infection B34.9      Disposition:   The patient was discharged home.     Discharge Medications:  Discharge Medication List as of 1/26/2018  9:21 PM      START taking these medications    Details   ondansetron (ZOFRAN ODT) 4 MG ODT tab Take 1 tablet (4 mg) by mouth every 8 hours as needed for nausea or vomiting, Disp-10 tablet, R-0, Local Print             Scribe Disclosure:  I, Tatyana Manrique, am serving as a scribe at 5:47 PM on 1/26/2018 to document services personally performed by Mahin Amador MD based on my observations and the provider's statements to me.     EMERGENCY DEPARTMENT       Mahin Amador MD  01/27/18 0001

## 2018-01-26 NOTE — ED AVS SNAPSHOT
Emergency Department    [address] [place] [address]    Phone:  [phone]    Fax:  [phone]                                       Shadia Gil   MRN: [id number]    Department:   Emergency Department   Date of Visit:  1/26/2018           After Visit Summary Signature Page     I have received my discharge instructions, and my questions have been answered. I have discussed any challenges I see with this plan with the nurse or doctor.    ..........................................................................................................................................  Patient/Patient Representative Signature      ..........................................................................................................................................  Patient Representative Print Name and Relationship to Patient    ..................................................               ................................................  Date                                            Time    ..........................................................................................................................................  Reviewed by Signature/Title    ...................................................              ..............................................  Date                                                            Time

## 2018-01-26 NOTE — ED NOTES
Presents to ED by self for nausea and diarrhea.  The diarrhea has been going on for 2 days now with very little to eat.  Did try to eat something last night but went right through.  This morning has a very small episode of vomit.  Is feeling nauseated at this time.

## 2018-01-26 NOTE — ED AVS SNAPSHOT
"  Emergency Department    [address] [facility] [address]    Phone:  [phone]    Fax:  [phone]                                       Shadia Gil   MRN: [id number]    Department:   Emergency Department   Date of Visit:  1/26/2018           Patient Information     Date Of Birth          1940        Your diagnoses for this visit were:     Dehydration     Viral infection        You were seen by Mahin Amador MD.      Follow-up Information     Follow up with Adryan Martinez MD.    Specialty:  Internal Medicine    Why:  As needed, If symptoms worsen    Contact information:    [address] [facility] [address]  [phone]          Follow up with  Emergency Department.    Specialty:  EMERGENCY MEDICINE    Why:  As needed, If symptoms worsen    Contact information:    [address] [facility] [address] [phone]        Discharge Instructions         Viral Syndrome (Adult)  A viral illness may cause a number of symptoms. The symptoms depend on the part of the body that the virus affects. If it settles in your nose, throat, and lungs, it may cause cough, sore throat, congestion, and sometimes headache. If it settles in your stomach and intestinal tract, it may cause vomiting and diarrhea. Sometimes it causes vague symptoms like \"aching all over,\" feeling tired, loss of appetite, or fever.  A viral illness usually lasts 1 to 2 weeks, but sometimes it lasts longer. In some cases, a more serious infection can look like a viral syndrome in the first few days of the illness. You may need another exam and additional tests to know the difference. Watch for the warning signs listed below.  Home care  Follow these guidelines for taking care of yourself at home:    If symptoms are severe, rest at home for the first 2 to 3 days.    Stay away from cigarette smoke - both your smoke and the smoke from others.    You may use over-the-counter acetaminophen or " ibuprofen for fever, muscle aching, and headache, unless another medicine was prescribed for this. If you have chronic liver or kidney disease or ever had a stomach ulcer or GI bleeding, talk with your doctor before using these medicines. No one who is younger than 18 and ill with a fever should take aspirin. It may cause severe disease or death.    Your appetite may be poor, so a light diet is fine. Avoid dehydration by drinking 8 to 12 8-ounce glasses of fluids each day. This may include water; orange juice; lemonade; apple, grape, and cranberry juice; clear fruit drinks; electrolyte replacement and sports drinks; and decaffeinated teas and coffee. If you have been diagnosed with a kidney disease, ask your doctor how much and what types of fluids you should drink to prevent dehydration. If you have kidney disease, drinking too much fluid can cause it build up in the your body and be dangerous to your health.    Over-the-counter remedies won't shorten the length of the illness but may be helpful for cough, sore throat; and nasal and sinus congestion. Don't use decongestants if you have high blood pressure.  Follow-up care  Follow up with your healthcare provider if you do not improve over the next week.  Call 911  Get emergency medical care if any of the following occur:    Convulsion    Feeling weak, dizzy, or like you are going to faint    Chest pain, shortness of breath, wheezing, or difficulty breathing  When to seek medical advice  Call your healthcare provider right away if any of these occur:    Cough with lots of colored sputum (mucus) or blood in your sputum    Chest pain, shortness of breath, wheezing, or difficulty breathing    Severe headache; face, neck, or ear pain    Severe, constant pain in the lower right side of your belly (abdominal)    Continued vomiting (can t keep liquids down)    Frequent diarrhea (more than 5 times a day); blood (red or black color) or mucus in diarrhea    Feeling weak,  dizzy, or like you are going to faint    Extreme thirst    Fever of 100.4 F (38 C) or higher, or as directed by your healthcare provider  Date Last Reviewed: 9/25/2015 2000-2017 [employer]. [address] [place] [address], [place] [address]. All rights reserved. This information is not intended as a substitute for professional medical care. Always follow your healthcare professional's instructions.          Dehydration (Adult)  Dehydration occurs when your body loses too much fluid. This may be the result of prolonged vomiting or diarrhea, excessive sweating, or a high fever. It may also happen if you don t drink enough fluid when you re sick or out in the heat. Misuse of diuretics (water pills) can also be a cause.  Symptoms include thirst and decreased urine output. You may also feel dizzy, weak, fatigued, or very drowsy. The diet described below is usually enough to treat dehydration. In some cases, you may need medicine.  Home care    Drink at least 12 8-ounce glasses of fluid every day to resolve the dehydration. Fluid may include water; orange juice; lemonade; apple, grape, or cranberry juice; clear fruit drinks; electrolyte replacement and sports drinks; and teas and coffee without caffeine. Don't drink alcohol. If you have been diagnosed with a kidney disease, ask your doctor how much and what types of fluids you should drink to prevent dehydration. If you have kidney disease, fluid can build up in the body. This can be dangerous to your health.    If you have a fever, muscle aches, or a headache as a result of a cold or flu, you may take acetaminophen or ibuprofen, unless another medicine was prescribed. If you have chronic liver or kidney disease, or have ever had a stomach ulcer or gastrointestinal bleeding, talk with your healthcare provider before using these medicines. Don't take aspirin if you are younger than 18 and have a fever. Aspirin raises the chance for severe liver injury.  Follow-up  care  Follow up with your healthcare provider, or as advised.  When to seek medical advice  Call your healthcare provider right away if any of these occur:    Continued vomiting    Frequent diarrhea (more than 5 times a day); blood (red or black color) or mucus in diarrhea    Blood in vomit or stool    Swollen abdomen or increasing abdominal pain    Weakness, dizziness, or fainting    Unusual drowsiness or confusion    Reduced urine output or extreme thirst    Fever of 100.4 F (38 C) or higher  Date Last Reviewed: 5/1/2017 2000-2017 [email or web address]. [address] [place] [address]. All rights reserved. This information is not intended as a substitute for professional medical care. Always follow your healthcare professional's instructions.          Dehydration    The human body is comprised largely of water. If you lose more fluids than you take in, you can become dehydrated. This means there are not enough fluids in your body for it to function right. Mild dehydration can cause weakness, confusion, or muscle cramps. In extreme cases, it can lead to brain damage and even death. That's why prompt treatment is crucial.  Risk factors  Anyone can become dehydrated. But infants, children, and older adults are at greatest risk. You are most likely to lose fluids with severe vomiting, diarrhea, or a fever. Exercising or working hard--especially in hot weather--can also cause excess fluid loss.  What to do  Drinking liquids is the best way to prevent dehydration. Water is best, but juice or frozen pops can also help. For adults, don't use liquids that contain caffeine or alcohol to rehydrate. Your doctor may suggest electrolyte solutions for sick infants and young children.  When to go to the emergency room (ER)  Go to an ER right away for these symptoms:  Adults    Very dark urine and little urine output    Dizziness, weakness, confusion, fainting  Children    Sunken eyes    Little or no urine output  (for infants, no wet diaper in 8 hours)    Very dark urine    Skin that doesn't bounce back quickly when pinched    Crying without tears    Lethargy, decreased activity, or increased sleepiness  What to expect in the emergency room  Your blood pressure, temperature, and heart rate will be checked. You may have blood or urine tests. The main treatment for dehydration is fluids. You may be given these to drink. Or, you may receive them through a vein in your arm. You also may be treated for diarrhea, vomiting, or a high fever.   Date Last Reviewed: 7/1/2017 2000-2017 [employer]. [address] [place] [address]. All rights reserved. This information is not intended as a substitute for professional medical care. Always follow your healthcare professional's instructions.          24 Hour Appointment Hotline       To make an appointment at any [facility], call [phone] ([phone]). If you don't have a family doctor or clinic, we will help you find one. [facility] are conveniently located to serve the needs of you and your family.             Review of your medicines      START taking        Dose / Directions Last dose taken    ondansetron 4 MG ODT tab   Commonly known as:  ZOFRAN ODT   Dose:  4 mg   Quantity:  10 tablet        Take 1 tablet (4 mg) by mouth every 8 hours as needed for nausea or vomiting   Refills:  0          Our records show that you are taking the medicines listed below. If these are incorrect, please call your family doctor or clinic.        Dose / Directions Last dose taken    aspirin 81 MG tablet   Dose:  81 mg        Take 1 tablet by mouth daily.   Refills:  0        cholecalciferol 1000 UNIT tablet   Commonly known as:  vitamin D3   Dose:  1000 Units        Take 1 tablet (1,000 Units) by mouth daily   Refills:  0        CoQ-10 200 MG Caps   Dose:  1 capsule        Take 1 capsule by mouth daily Take at same time as statin cholesterol medications    Refills:  0        lisinopril 10 MG tablet   Commonly known as:  PRINIVIL/ZESTRIL   Dose:  10 mg   Quantity:  180 tablet        Take 1 tablet (10 mg) by mouth 2 times daily   Refills:  3        metoprolol tartrate 50 MG tablet   Commonly known as:  LOPRESSOR   Dose:  50 mg   Quantity:  180 tablet        Take 1 tablet (50 mg) by mouth 2 times daily   Refills:  3        nitroGLYcerin 0.4 MG sublingual tablet   Commonly known as:  NITROSTAT   Dose:  0.4 mg   Quantity:  25 tablet        Place 1 tablet (0.4 mg) under the tongue every 5 minutes as needed for chest pain   Refills:  0        pravastatin 80 MG tablet   Commonly known as:  PRAVACHOL   Dose:  80 mg   Quantity:  90 tablet        Take 1 tablet (80 mg) by mouth daily   Refills:  3        sodium chloride 0.65 % nasal spray   Commonly known as:  OCEAN   Dose:  1 spray   Quantity:  1 Bottle        Spray 1 spray into both nostrils daily as needed for congestion   Refills:  0                Prescriptions were sent or printed at these locations (1 Prescription)                   Other Prescriptions                Printed at Department/Unit printer (1 of 1)         ondansetron (ZOFRAN ODT) 4 MG ODT tab                Procedures and tests performed during your visit     CBC with platelets + differential    Comprehensive metabolic panel    Influenza A/B antigen    XR Chest 2 Views      Orders Needing Specimen Collection     Ordered          01/26/18 1820  UA with Microscopic - STAT, [facility]: STAT, Needs to be Collected     Scheduled Task Status   01/26/18 1821 Collect UA with Microscopic Open   Order Class:  PCU Collect                  Pending Results     No orders found from 1/24/2018 to 1/27/2018.            Pending Culture Results     No orders found from 1/24/2018 to 1/27/2018.            Pending Results Instructions     If you had any lab results that were not finalized at the time of your Discharge, you can call the ED Lab Result RN at [phone]. You will be  contacted by this team for any positive Lab results or changes in treatment. The nurses are available 7 days a week from 10A to 6:30P.  You can leave a message 24 hours per day and they will return your call.        Test Results From Your Hospital Stay        1/26/2018  5:56 PM      Component Results     Component Value Ref Range & Units Status    WBC 6.7 4.0 - 11.0 10e9/L Final    RBC Count 4.95 3.8 - 5.2 10e12/L Final    Hemoglobin 15.0 11.7 - 15.7 g/dL Final    Hematocrit 44.1 35.0 - 47.0 % Final    MCV 89 78 - 100 fl Final    MCH 30.3 26.5 - 33.0 pg Final    MCHC 34.0 31.5 - 36.5 g/dL Final    RDW 14.4 10.0 - 15.0 % Final    Platelet Count 176 150 - 450 10e9/L Final    Diff Method Automated Method  Final    % Neutrophils 87.7 % Final    % Lymphocytes 5.5 % Final    % Monocytes 6.5 % Final    % Eosinophils 0.1 % Final    % Basophils 0.1 % Final    % Immature Granulocytes 0.1 % Final    Nucleated RBCs 0 0 /100 Final    Absolute Neutrophil 5.9 1.6 - 8.3 10e9/L Final    Absolute Lymphocytes 0.4 (L) 0.8 - 5.3 10e9/L Final    Absolute Monocytes 0.4 0.0 - 1.3 10e9/L Final    Absolute Eosinophils 0.0 0.0 - 0.7 10e9/L Final    Absolute Basophils 0.0 0.0 - 0.2 10e9/L Final    Abs Immature Granulocytes 0.0 0 - 0.4 10e9/L Final    Absolute Nucleated RBC 0.0  Final         1/26/2018  6:16 PM      Component Results     Component Value Ref Range & Units Status    Sodium 136 133 - 144 mmol/L Final    Potassium 4.6 3.4 - 5.3 mmol/L Final    Specimen slightly hemolyzed, potassium may be falsely elevated    Chloride 103 94 - 109 mmol/L Final    Carbon Dioxide 23 20 - 32 mmol/L Final    Anion Gap 10 3 - 14 mmol/L Final    Glucose 144 (H) 70 - 99 mg/dL Final    Urea Nitrogen 31 (H) 7 - 30 mg/dL Final    Creatinine 1.18 (H) 0.52 - 1.04 mg/dL Final    GFR Estimate 44 (L) >60 mL/min/1.7m2 Final    Non  GFR Calc    GFR Estimate If Black 53 (L) >60 mL/min/1.7m2 Final    African American GFR Calc    Calcium 8.7 8.5 - 10.1  mg/dL Final    Bilirubin Total 1.0 0.2 - 1.3 mg/dL Final    Albumin 3.7 3.4 - 5.0 g/dL Final    Protein Total 7.4 6.8 - 8.8 g/dL Final    Alkaline Phosphatase 50 40 - 150 U/L Final    ALT 22 0 - 50 U/L Final    AST 29 0 - 45 U/L Final    Specimen is hemolyzed which can falsely elevate AST. Analysis of a   non-hemolyzed specimen may result in a lower value.           1/26/2018  7:04 PM      Component Results     Component Value Ref Range & Units Status    Influenza A/B Agn Specimen Nasopharyngeal  Final    Influenza A Negative NEG^Negative Final    Influenza B Negative NEG^Negative Final    Test results must be correlated with clinical data. If necessary, results   should be confirmed by a molecular assay or viral culture.           1/26/2018  7:01 PM      Narrative     CHEST TWO VIEWS  1/26/2018 6:35 PM     HISTORY: Cough, pneumonia.    COMPARISON: 2/2/2016        Impression     IMPRESSION: No active disease. Previous midline sternotomy and  transvenous cardiac device again noted. Degenerative changes seen in  spine.    DARNELL DILL MD                Clinical Quality Measure: Blood Pressure Screening     Your blood pressure was checked while you were in the emergency department today. The last reading we obtained was  BP: 131/62 . Please read the guidelines below about what these numbers mean and what you should do about them.  If your systolic blood pressure (the top number) is less than 120 and your diastolic blood pressure (the bottom number) is less than 80, then your blood pressure is normal. There is nothing more that you need to do about it.  If your systolic blood pressure (the top number) is 120-139 or your diastolic blood pressure (the bottom number) is 80-89, your blood pressure may be higher than it should be. You should have your blood pressure rechecked within a year by a primary care provider.  If your systolic blood pressure (the top number) is 140 or greater or your diastolic blood pressure (the  "bottom number) is 90 or greater, you may have high blood pressure. High blood pressure is treatable, but if left untreated over time it can put you at risk for heart attack, stroke, or kidney failure. You should have your blood pressure rechecked by a primary care provider within the next 4 weeks.  If your provider in the emergency department today gave you specific instructions to follow-up with your doctor or provider even sooner than that, you should follow that instruction and not wait for up to 4 weeks for your follow-up visit.        Thank you for choosing [facility]       Thank you for choosing [facility] for your care. Our goal is always to provide you with excellent care. Hearing back from our patients is one way we can continue to improve our services. Please take a few minutes to complete the written survey that you may receive in the mail after you visit with us. Thank you!        [employer] Information     [employer] lets you send messages to your doctor, view your test results, renew your prescriptions, schedule appointments and more. To sign up, go to [email or web address] . Click on \"Log in\" on the left side of the screen, which will take you to the Welcome page. Then click on \"Sign up Now\" on the right side of the page.     You will be asked to enter the access code listed below, as well as some personal information. Please follow the directions to create your username and password.     Your access code is: [email or web address]  Expires: 2018  9:21 PM     Your access code will  in 90 days. If you need help or a new code, please call your [facility] clinic or [phone].        Care EveryWhere ID     This is your Care EveryWhere ID. This could be used by other organizations to access your [facility] medical records  [id number]        Equal Access to Services     LILY SMALLS : Nani Rajput, ebony porter, dorcas flores. So debbie " [phone].    ATENCIÓN: Si habla español, tiene a cavazos disposición servicios gratuitos de asistencia lingüística. Llame al [phone].    We comply with applicable federal civil rights laws and [place] laws. We do not discriminate on the basis of race, color, national origin, age, disability, sex, sexual orientation, or gender identity.            After Visit Summary       This is your record. Keep this with you and show to your community pharmacist(s) and doctor(s) at your next visit.

## 2018-01-27 NOTE — DISCHARGE INSTRUCTIONS
"  Viral Syndrome (Adult)  A viral illness may cause a number of symptoms. The symptoms depend on the part of the body that the virus affects. If it settles in your nose, throat, and lungs, it may cause cough, sore throat, congestion, and sometimes headache. If it settles in your stomach and intestinal tract, it may cause vomiting and diarrhea. Sometimes it causes vague symptoms like \"aching all over,\" feeling tired, loss of appetite, or fever.  A viral illness usually lasts 1 to 2 weeks, but sometimes it lasts longer. In some cases, a more serious infection can look like a viral syndrome in the first few days of the illness. You may need another exam and additional tests to know the difference. Watch for the warning signs listed below.  Home care  Follow these guidelines for taking care of yourself at home:    If symptoms are severe, rest at home for the first 2 to 3 days.    Stay away from cigarette smoke - both your smoke and the smoke from others.    You may use over-the-counter acetaminophen or ibuprofen for fever, muscle aching, and headache, unless another medicine was prescribed for this. If you have chronic liver or kidney disease or ever had a stomach ulcer or GI bleeding, talk with your doctor before using these medicines. No one who is younger than 18 and ill with a fever should take aspirin. It may cause severe disease or death.    Your appetite may be poor, so a light diet is fine. Avoid dehydration by drinking 8 to 12 8-ounce glasses of fluids each day. This may include water; orange juice; lemonade; apple, grape, and cranberry juice; clear fruit drinks; electrolyte replacement and sports drinks; and decaffeinated teas and coffee. If you have been diagnosed with a kidney disease, ask your doctor how much and what types of fluids you should drink to prevent dehydration. If you have kidney disease, drinking too much fluid can cause it build up in the your body and be dangerous to your " health.    Over-the-counter remedies won't shorten the length of the illness but may be helpful for cough, sore throat; and nasal and sinus congestion. Don't use decongestants if you have high blood pressure.  Follow-up care  Follow up with your healthcare provider if you do not improve over the next week.  Call 911  Get emergency medical care if any of the following occur:    Convulsion    Feeling weak, dizzy, or like you are going to faint    Chest pain, shortness of breath, wheezing, or difficulty breathing  When to seek medical advice  Call your healthcare provider right away if any of these occur:    Cough with lots of colored sputum (mucus) or blood in your sputum    Chest pain, shortness of breath, wheezing, or difficulty breathing    Severe headache; face, neck, or ear pain    Severe, constant pain in the lower right side of your belly (abdominal)    Continued vomiting (can t keep liquids down)    Frequent diarrhea (more than 5 times a day); blood (red or black color) or mucus in diarrhea    Feeling weak, dizzy, or like you are going to faint    Extreme thirst    Fever of 100.4 F (38 C) or higher, or as directed by your healthcare provider  Date Last Reviewed: 9/25/2015 2000-2017 [employer]. [address] [place] [address]. All rights reserved. This information is not intended as a substitute for professional medical care. Always follow your healthcare professional's instructions.          Dehydration (Adult)  Dehydration occurs when your body loses too much fluid. This may be the result of prolonged vomiting or diarrhea, excessive sweating, or a high fever. It may also happen if you don t drink enough fluid when you re sick or out in the heat. Misuse of diuretics (water pills) can also be a cause.  Symptoms include thirst and decreased urine output. You may also feel dizzy, weak, fatigued, or very drowsy. The diet described below is usually enough to treat dehydration. In some  cases, you may need medicine.  Home care    Drink at least 12 8-ounce glasses of fluid every day to resolve the dehydration. Fluid may include water; orange juice; lemonade; apple, grape, or cranberry juice; clear fruit drinks; electrolyte replacement and sports drinks; and teas and coffee without caffeine. Don't drink alcohol. If you have been diagnosed with a kidney disease, ask your doctor how much and what types of fluids you should drink to prevent dehydration. If you have kidney disease, fluid can build up in the body. This can be dangerous to your health.    If you have a fever, muscle aches, or a headache as a result of a cold or flu, you may take acetaminophen or ibuprofen, unless another medicine was prescribed. If you have chronic liver or kidney disease, or have ever had a stomach ulcer or gastrointestinal bleeding, talk with your healthcare provider before using these medicines. Don't take aspirin if you are younger than 18 and have a fever. Aspirin raises the chance for severe liver injury.  Follow-up care  Follow up with your healthcare provider, or as advised.  When to seek medical advice  Call your healthcare provider right away if any of these occur:    Continued vomiting    Frequent diarrhea (more than 5 times a day); blood (red or black color) or mucus in diarrhea    Blood in vomit or stool    Swollen abdomen or increasing abdominal pain    Weakness, dizziness, or fainting    Unusual drowsiness or confusion    Reduced urine output or extreme thirst    Fever of 100.4 F (38 C) or higher  Date Last Reviewed: 5/1/2017 2000-2017 [employer]. [address] [place] [address], [place] [address]. All rights reserved. This information is not intended as a substitute for professional medical care. Always follow your healthcare professional's instructions.          Dehydration    The human body is comprised largely of water. If you lose more fluids than you take in, you can become dehydrated. This  means there are not enough fluids in your body for it to function right. Mild dehydration can cause weakness, confusion, or muscle cramps. In extreme cases, it can lead to brain damage and even death. That's why prompt treatment is crucial.  Risk factors  Anyone can become dehydrated. But infants, children, and older adults are at greatest risk. You are most likely to lose fluids with severe vomiting, diarrhea, or a fever. Exercising or working hard--especially in hot weather--can also cause excess fluid loss.  What to do  Drinking liquids is the best way to prevent dehydration. Water is best, but juice or frozen pops can also help. For adults, don't use liquids that contain caffeine or alcohol to rehydrate. Your doctor may suggest electrolyte solutions for sick infants and young children.  When to go to the emergency room (ER)  Go to an ER right away for these symptoms:  Adults    Very dark urine and little urine output    Dizziness, weakness, confusion, fainting  Children    Sunken eyes    Little or no urine output (for infants, no wet diaper in 8 hours)    Very dark urine    Skin that doesn't bounce back quickly when pinched    Crying without tears    Lethargy, decreased activity, or increased sleepiness  What to expect in the emergency room  Your blood pressure, temperature, and heart rate will be checked. You may have blood or urine tests. The main treatment for dehydration is fluids. You may be given these to drink. Or, you may receive them through a vein in your arm. You also may be treated for diarrhea, vomiting, or a high fever.   Date Last Reviewed: 7/1/2017 2000-2017 [employer]. [address] [place] [address], [place] [address]. All rights reserved. This information is not intended as a substitute for professional medical care. Always follow your healthcare professional's instructions.

## 2018-05-24 ENCOUNTER — OFFICE VISIT (OUTPATIENT)
Dept: CARDIOLOGY | Facility: CLINIC | Age: 78
End: 2018-05-24
Payer: MEDICARE

## 2018-05-24 ENCOUNTER — DOCUMENTATION ONLY (OUTPATIENT)
Dept: CARDIOLOGY | Facility: CLINIC | Age: 78
End: 2018-05-24

## 2018-05-24 ENCOUNTER — ALLIED HEALTH/NURSE VISIT (OUTPATIENT)
Dept: CARDIOLOGY | Facility: CLINIC | Age: 78
End: 2018-05-24
Payer: MEDICARE

## 2018-05-24 VITALS
HEIGHT: 62 IN | OXYGEN SATURATION: 98 % | WEIGHT: 226 LBS | HEART RATE: 60 BPM | BODY MASS INDEX: 41.59 KG/M2 | DIASTOLIC BLOOD PRESSURE: 70 MMHG | SYSTOLIC BLOOD PRESSURE: 140 MMHG

## 2018-05-24 DIAGNOSIS — Z95.0 CARDIAC PACEMAKER IN SITU: Primary | ICD-10-CM

## 2018-05-24 DIAGNOSIS — R00.1 BRADYCARDIA: ICD-10-CM

## 2018-05-24 DIAGNOSIS — I49.5 TACHYCARDIA-BRADYCARDIA (H): Primary | ICD-10-CM

## 2018-05-24 DIAGNOSIS — I25.750 CORONARY ARTERY DISEASE INVOLVING NATIVE ARTERY OF TRANSPLANTED HEART WITH UNSTABLE ANGINA PECTORIS (H): ICD-10-CM

## 2018-05-24 DIAGNOSIS — E78.5 HYPERLIPIDEMIA LDL GOAL <100: ICD-10-CM

## 2018-05-24 DIAGNOSIS — I10 ESSENTIAL HYPERTENSION, BENIGN: ICD-10-CM

## 2018-05-24 PROCEDURE — 93280 PM DEVICE PROGR EVAL DUAL: CPT | Performed by: INTERNAL MEDICINE

## 2018-05-24 PROCEDURE — 99214 OFFICE O/P EST MOD 30 MIN: CPT | Performed by: INTERNAL MEDICINE

## 2018-05-24 RX ORDER — MULTIPLE VITAMINS W/ MINERALS TAB 9MG-400MCG
1 TAB ORAL DAILY
COMMUNITY

## 2018-05-24 NOTE — PROGRESS NOTES
Service Date: 05/24/2018      REASON FOR VISIT:  Followup visit.      HISTORY OF PRESENT ILLNESS:  Ms. Gil is an 78-year-old lady who comes in routine followup.  She has a history of coronary artery disease as outlined in my note of 01/23/2014 with a bypass in 01/2010 including a LIMA to the LAD and vein graft to the OM, OM2 and to RCA.  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  She is on a beta blocker.  Interrogations have consistently demonstrated very brief, less than 1 minute mode switch episodes without EGMs, presumably related to her history of SVT.      Ms. Gil has no cardiorespiratory complaints of chest pain, shortness of breath, lightheadedness, presyncope, syncope, PND, orthopnea, palpitations or pedal edema.  From the standpoint of overall risk factor modification, she has unfortunately gained 27 pounds since her visit of 09/28/2016 and her body mass index is now 41.42.  She has an unrestricted sodium diet.  Her lipids are suboptimal with an LDL of 81, HDL of 68, triglycerides of 177 in November of last year.  Her blood pressure is uncontrolled at 140/70 today.  Her heart rate is 60 with a pacemaker in place.      She has been intolerant to atorvastatin in the past with myalgias.  She remains on a low-intensity statin, Pravachol 80 mg daily.  She is otherwise on an ACE inhibitor and beta blocker as well as baby aspirin.  She has mild to moderate renal insufficiency with prerenal azotemia on her last labs in January, followed by her primary physician.      ASSESSMENT AND PLAN:  An 78-year-old lady with coronary disease and ongoing risk factors as outlined above.  We have discussed weight loss and she states that she is going to try to lose weight which she thinks will affect her blood pressure and cholesterol as well as overall risk.  She is certainly correct, however, I have recommended intensification of her statin regimen as well as her blood pressure  control in the interim also.  I have discussed a reduced risk of heart attack with goal blood pressure of less than 120/80, also with intensification of her statin regimen for a goal LDL less than 70.  She has verbalized understanding, but does not want to make any changes to her medical regimen at this time nonetheless.   She does agree to try to restrict her sodium intake to 2 to 2 1/2 grams a day or less.  She agrees to be seen in November in followup of the efforts for weight change and risk factor modification and will readdress this at this time.      I do note mode switches on her interrogations and I think this is likely related to brief runs of SVT.  I will touch base with EP in this regard.      Total time is 30 minutes, 25 in coordination of care and counseling.      cc:   [place] [address]         CHRISTINE CONTI MD             D: 2018   T: 2018   MT: ANNABELLE      Name:     BHARAT TREVINO   MRN:      [id number]        Account:      [id number]   :      1940           Service Date: 2018      Document: [id number]

## 2018-05-24 NOTE — PROGRESS NOTES
[employer] Adapta L ADDRL1 Pacemaker Device Check  AP: 92 % : 0.1 %  Mode: AAI-DDD  bpm        Underlying Rhythm: SB in the 30-40's  Heart Rate: Stable with a flat histogram. Patient states she does not walk much due to a sore knee. Denies any SOB with activity.  Sensing: WNL    Pacing Threshold: WNL   Impedance: WNL  Battery Status: Approximately 11 years longevity.  Device Site: Good  Atrial Arrhythmia: 11 mode switches, no stored EGMs, all < 1 minute.  Ventricular Arrhythmia: 0  Setting Change: 0    Care Plan: Seeing Dr. Linda today. Follow up with q 3 month remote checks. BAHMAN Mckeon

## 2018-05-24 NOTE — LETTER
"5/24/2018    Adryan Martinez MD  [address] [facility] [address]    RE: Shadia Gil       Dear Colleague,    I had the pleasure of seeing Shadia Gil in the [facility].    DIAGNOSES:      Encounter Diagnoses   Name Primary?     Tachycardia-bradycardia (H) Yes     Bradycardia      Coronary artery disease involving native artery  (H)      Essential hypertension, benign      Hyperlipidemia LDL goal <100          HPI:  See [id number]        MEDICATIONS:    Current Outpatient Prescriptions   Medication Sig Dispense Refill     aspirin 81 MG tablet Take 1 tablet by mouth daily.       cholecalciferol (VITAMIN D3) 1000 UNIT tablet Take 1 tablet (1,000 Units) by mouth daily       Coenzyme Q10 (COQ-10) 200 MG CAPS Take 1 capsule by mouth daily Take at same time as statin cholesterol medications       lisinopril (PRINIVIL/ZESTRIL) 10 MG tablet Take 1 tablet (10 mg) by mouth 2 times daily 180 tablet 3     metoprolol (LOPRESSOR) 50 MG tablet Take 1 tablet (50 mg) by mouth 2 times daily 180 tablet 3     multivitamin, therapeutic with minerals (MULTI-VITAMIN) TABS tablet Take 1 tablet by mouth daily       nitroglycerin (NITROSTAT) 0.4 MG SL tablet Place 1 tablet (0.4 mg) under the tongue every 5 minutes as needed for chest pain 25 tablet 0     pravastatin (PRAVACHOL) 80 MG tablet Take 1 tablet (80 mg) by mouth daily 90 tablet 3         ALLERGIES     Allergies   Allergen Reactions     Atorvastatin Other (See Comments)     Myalgias, dizziness     Codeine      States \"there was only a question about it\"     Penicillins      States,\" there is a question about it\"       PAST MEDICAL HISTORY:  Past Medical History:   Diagnosis Date     CAD (coronary artery disease) 1/29/10    CABG x 4; LIMA to the LAD and vein graft to the OM, OM2 and RCA      Essential hypertension, benign      GERD (gastroesophageal reflux disease)      Hyperlipidemia LDL goal <100 7/11/11     " "    Obesity (BMI 30-39.9) 2015    BMI 36.8     Vitamin D deficiency        PAST SURGICAL HISTORY:  Past Surgical History:   Procedure Laterality Date     CORONARY ARTERY BYPASS  1/29/10    4 vessel CABG at [facility]; Four-vessel coronary artery bypass, beating heart technique.  Endoscopic vein harvest using the saphenous vein for the distal right, saphenous vein for the first and second marginal branches of the circumflex, left internal mammary artery for the LAD     IMPLANT PACEMAKER  11/2012    Dual Chamber     LAPAROSCOPIC CHOLECYSTECTOMY N/A 2/2/2016    Procedure: LAPAROSCOPIC CHOLECYSTECTOMY;  Surgeon: Maurilio Burger MD;  Location:  OR       FAMILY HISTORY:  Family History   Problem Relation Age of Onset     CANCER Mother      HEART DISEASE Brother      C.A.D. Brother        SOCIAL HISTORY:  Social History     Social History     Marital status:      Spouse name: N/A     Number of children: N/A     Years of education: N/A     Social History Main Topics     Smoking status: Never Smoker     Smokeless tobacco: Never Used     Alcohol use No     Drug use: No     Sexual activity: Not Currently     Other Topics Concern     Caffeine Concern Yes     tea occasional      Exercise No     Social History Narrative       Review of Systems:  Skin:  Negative     Eyes:  Positive for glasses  ENT:  Negative    Respiratory:  Negative    Cardiovascular:  Negative    Gastroenterology: Negative    Genitourinary:  not assessed    Musculoskeletal:  Positive for joint pain  Neurologic:  Negative    Psychiatric:  Negative    Heme/Lymph/Imm:  Negative    Endocrine:  Negative      Physical Exam:  Vitals: /70  Pulse 60  Ht 1.575 m (5' 2\")  Wt 102.5 kg (226 lb)  SpO2 98%  BMI 41.34 kg/m2    Constitutional:  cooperative, alert and oriented, well developed, well nourished, in no acute distress morbidly obese      Skin:  warm and dry to the touch, no apparent skin lesions or masses noted        Head:  normocephalic, no " masses or lesions        Eyes:  pupils equal and round, conjunctivae and lids unremarkable, sclera white, no xanthalasma, EOMS intact, no nystagmus        ENT:  no pallor or cyanosis, dentition good        Neck:  carotid pulses are full and equal bilaterally;JVP normal;no carotid bruit        Chest:  normal breath sounds, clear to auscultation, normal A-P diameter, normal symmetry, normal respiratory excursion, no use of accessory muscles        Cardiac: regular rhythm, normal S1/S2, no S3 or S4, apical impulse not displaced, no murmurs, gallops or rubs                  Abdomen:  abdomen soft;BS normoactive        Vascular: pulses full and equal                                        Extremities and Back:  no edema;no deformities, clubbing, cyanosis, erythema observed              Neurological:  affect appropriate;no gross motor deficits          ASSESSMENT AND PLAN:    See dictation    ORDERS AT TODAY'S VISIT:    Orders Placed This Encounter   Procedures     Follow-Up with Cardiac Advanced Practice Provider           CC  Adryan Martinez MD  [address] [place], [place] [id number]              Thank you for allowing me to participate in the care of your patient.      Sincerely,     Syeda Linda MD     [facility]    cc:   Adryan Martinez MD  [facility] [address] [place] [id number]

## 2018-05-24 NOTE — PROGRESS NOTES
"DIAGNOSES:      Encounter Diagnoses   Name Primary?     Tachycardia-bradycardia (H) Yes     Bradycardia      Coronary artery disease involving native artery  (H)      Essential hypertension, benign      Hyperlipidemia LDL goal <100          HPI:  See [id number]        MEDICATIONS:    Current Outpatient Prescriptions   Medication Sig Dispense Refill     aspirin 81 MG tablet Take 1 tablet by mouth daily.       cholecalciferol (VITAMIN D3) 1000 UNIT tablet Take 1 tablet (1,000 Units) by mouth daily       Coenzyme Q10 (COQ-10) 200 MG CAPS Take 1 capsule by mouth daily Take at same time as statin cholesterol medications       lisinopril (PRINIVIL/ZESTRIL) 10 MG tablet Take 1 tablet (10 mg) by mouth 2 times daily 180 tablet 3     metoprolol (LOPRESSOR) 50 MG tablet Take 1 tablet (50 mg) by mouth 2 times daily 180 tablet 3     multivitamin, therapeutic with minerals (MULTI-VITAMIN) TABS tablet Take 1 tablet by mouth daily       nitroglycerin (NITROSTAT) 0.4 MG SL tablet Place 1 tablet (0.4 mg) under the tongue every 5 minutes as needed for chest pain 25 tablet 0     pravastatin (PRAVACHOL) 80 MG tablet Take 1 tablet (80 mg) by mouth daily 90 tablet 3         ALLERGIES     Allergies   Allergen Reactions     Atorvastatin Other (See Comments)     Myalgias, dizziness     Codeine      States \"there was only a question about it\"     Penicillins      States,\" there is a question about it\"       PAST MEDICAL HISTORY:  Past Medical History:   Diagnosis Date     CAD (coronary artery disease) 1/29/10    CABG x 4; LIMA to the LAD and vein graft to the OM, OM2 and RCA      Essential hypertension, benign      GERD (gastroesophageal reflux disease)      Hyperlipidemia LDL goal <100 7/11/11         Obesity (BMI 30-39.9) 2015    BMI 36.8     Vitamin D deficiency        PAST SURGICAL HISTORY:  Past Surgical History:   Procedure Laterality Date     CORONARY ARTERY BYPASS  1/29/10    4 vessel CABG at [facility]; Four-vessel coronary " "artery bypass, beating heart technique.  Endoscopic vein harvest using the saphenous vein for the distal right, saphenous vein for the first and second marginal branches of the circumflex, left internal mammary artery for the LAD     IMPLANT PACEMAKER  11/2012    Dual Chamber     LAPAROSCOPIC CHOLECYSTECTOMY N/A 2/2/2016    Procedure: LAPAROSCOPIC CHOLECYSTECTOMY;  Surgeon: Maurilio Burger MD;  Location:  OR       FAMILY HISTORY:  Family History   Problem Relation Age of Onset     CANCER Mother      HEART DISEASE Brother      C.A.D. Brother        SOCIAL HISTORY:  Social History     Social History     Marital status:      Spouse name: N/A     Number of children: N/A     Years of education: N/A     Social History Main Topics     Smoking status: Never Smoker     Smokeless tobacco: Never Used     Alcohol use No     Drug use: No     Sexual activity: Not Currently     Other Topics Concern     Caffeine Concern Yes     tea occasional      Exercise No     Social History Narrative       Review of Systems:  Skin:  Negative     Eyes:  Positive for glasses  ENT:  Negative    Respiratory:  Negative    Cardiovascular:  Negative    Gastroenterology: Negative    Genitourinary:  not assessed    Musculoskeletal:  Positive for joint pain  Neurologic:  Negative    Psychiatric:  Negative    Heme/Lymph/Imm:  Negative    Endocrine:  Negative      Physical Exam:  Vitals: /70  Pulse 60  Ht 1.575 m (5' 2\")  Wt 102.5 kg (226 lb)  SpO2 98%  BMI 41.34 kg/m2    Constitutional:  cooperative, alert and oriented, well developed, well nourished, in no acute distress morbidly obese      Skin:  warm and dry to the touch, no apparent skin lesions or masses noted        Head:  normocephalic, no masses or lesions        Eyes:  pupils equal and round, conjunctivae and lids unremarkable, sclera white, no xanthalasma, EOMS intact, no nystagmus        ENT:  no pallor or cyanosis, dentition good        Neck:  carotid pulses are full " and equal bilaterally;JVP normal;no carotid bruit        Chest:  normal breath sounds, clear to auscultation, normal A-P diameter, normal symmetry, normal respiratory excursion, no use of accessory muscles        Cardiac: regular rhythm, normal S1/S2, no S3 or S4, apical impulse not displaced, no murmurs, gallops or rubs                  Abdomen:  abdomen soft;BS normoactive        Vascular: pulses full and equal                                        Extremities and Back:  no edema;no deformities, clubbing, cyanosis, erythema observed              Neurological:  affect appropriate;no gross motor deficits          ASSESSMENT AND PLAN:    See dictation    ORDERS AT TODAY'S VISIT:    Orders Placed This Encounter   Procedures     Follow-Up with Cardiac Advanced Practice Provider           CC  Adryan Martinez MD  [address] [place] [id number]

## 2018-05-24 NOTE — MR AVS SNAPSHOT
After Visit Summary   5/24/2018    Shadia Gil    MRN: [id number]           Patient Information     Date Of Birth          1940        Visit Information        Provider Department      5/24/2018 10:45 AM Syeda Linda MD [facility]        Today's Diagnoses     Tachycardia-bradycardia (H)    -  1    Bradycardia        Coronary artery disease involving native artery  (H)        Essential hypertension, benign        Hyperlipidemia LDL goal <100           Follow-ups after your visit        Additional Services     Follow-Up with Cardiac Advanced Practice Provider                 Your next 10 appointments already scheduled     Aug 30, 2018  3:45 PM CDT   Remote PPM Check with RODRIGUES TECH1   [facility] ([facility])    [address]  [facility] [address] [phone] OPT 2           This appointment is for a remote check of your pacemaker.  This is not an appointment at the office.              Future tests that were ordered for you today     Open Future Orders        Priority Expected Expires Ordered    Follow-Up with Cardiac Advanced Practice Provider Routine 11/7/2018 5/23/2020 5/24/2018            Who to contact     If you have questions or need follow up information about today's clinic visit or your schedule please contact [facility] directly at [phone].  Normal or non-critical lab and imaging results will be communicated to you by MyChart, letter or phone within 4 business days after the clinic has received the results. If you do not hear from us within 7 days, please contact the clinic through MyChart or phone. If you have a critical or abnormal lab result, we will notify you by phone as soon as possible.  Submit refill requests through [employer] or call your pharmacy and they will forward the refill request to us. Please allow 3  "business days for your refill to be completed.          Additional Information About Your Visit        Care EveryWhere ID     This is your Care EveryWhere ID. This could be used by other organizations to access your [place] medical records  [id number]        Your Vitals Were     Pulse Height Pulse Oximetry BMI (Body Mass Index)          60 1.575 m (5' 2\") 98% 41.34 kg/m2         Blood Pressure from Last 3 Encounters:   05/24/18 140/70   01/26/18 131/62   11/22/17 132/68    Weight from Last 3 Encounters:   05/24/18 102.5 kg (226 lb)   01/26/18 96.2 kg (212 lb)   11/22/17 95.7 kg (210 lb 14.4 oz)               Primary Care Provider Office Phone # Fax #    Adryan Martinez [phone]       [address] [facility] [address]        Equal Access to Services     [facility] : Hadii aad ku hadasho Soomaali, waaxda luqadaha, qaybta kaalmada adeegyada, waxay bekahin hayduncann carine ceballos . So [facility] [phone].    ATENCIÓN: Si habla español, tiene a cavazos disposición servicios gratuitos de asistencia lingüística. [place] al [phone].    We comply with applicable federal civil rights laws and [place] laws. We do not discriminate on the basis of race, color, national origin, age, disability, sex, sexual orientation, or gender identity.            Thank you!     Thank you for choosing [facility]  for your care. Our goal is always to provide you with excellent care. Hearing back from our patients is one way we can continue to improve our services. Please take a few minutes to complete the written survey that you may receive in the mail after your visit with us. Thank you!             Your Updated Medication List - Protect others around you: Learn how to safely use, store and throw away your medicines at www.disposemymeds.org.          This list is accurate as of 5/24/18 11:20 AM.  Always use your most recent med list.                   Brand Name Dispense " Instructions for use Diagnosis    aspirin 81 MG tablet      Take 1 tablet by mouth daily.    CAD (coronary artery disease), Essential hypertension, benign       cholecalciferol 1000 UNIT tablet    vitamin D3     Take 1 tablet (1,000 Units) by mouth daily    Vitamin D deficiency       CoQ-10 200 MG Caps      Take 1 capsule by mouth daily Take at same time as statin cholesterol medications    Hyperlipidemia LDL goal <100       lisinopril 10 MG tablet    PRINIVIL/ZESTRIL    180 tablet    Take 1 tablet (10 mg) by mouth 2 times daily    Essential hypertension, benign       metoprolol tartrate 50 MG tablet    LOPRESSOR    180 tablet    Take 1 tablet (50 mg) by mouth 2 times daily    Coronary artery disease involving native coronary artery of native heart without angina pectoris, Essential hypertension, benign       Multi-vitamin Tabs tablet      Take 1 tablet by mouth daily        nitroGLYcerin 0.4 MG sublingual tablet    NITROSTAT    25 tablet    Place 1 tablet (0.4 mg) under the tongue every 5 minutes as needed for chest pain    CAD (coronary artery disease)       pravastatin 80 MG tablet    PRAVACHOL    90 tablet    Take 1 tablet (80 mg) by mouth daily    Coronary artery disease involving native coronary artery of native heart without angina pectoris, Hyperlipidemia LDL goal <100

## 2018-05-24 NOTE — LETTER
5/24/2018      Adryan Martinez MD  [address] [facility] [address]      RE: Shadia Cabrera       Dear Colleague,    I had the pleasure of seeing Shadia Gil in the [facility].    Service Date: 05/24/2018      REASON FOR VISIT:  Followup visit.      HISTORY OF PRESENT ILLNESS:  Ms. Gil is an 78-year-old lady who comes in routine followup.  She has a history of coronary artery disease as outlined in my note of 01/23/2014 with a bypass in 01/2010 including a LIMA to the LAD and vein graft to the OM, OM2 and to RCA.  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  She is on a beta blocker.  Interrogations have consistently demonstrated very brief, less than 1 minute mode switch episodes without EGMs, presumably related to her history of SVT.      Ms. Gil has no cardiorespiratory complaints of chest pain, shortness of breath, lightheadedness, presyncope, syncope, PND, orthopnea, palpitations or pedal edema.  From the standpoint of overall risk factor modification, she has unfortunately gained 27 pounds since her visit of 09/28/2016 and her body mass index is now 41.42.  She has an unrestricted sodium diet.  Her lipids are suboptimal with an LDL of 81, HDL of 68, triglycerides of 177 in November of last year.  Her blood pressure is uncontrolled at 140/70 today.  Her heart rate is 60 with a pacemaker in place.      She has been intolerant to atorvastatin in the past with myalgias.  She remains on a low-intensity statin, Pravachol 80 mg daily.  She is otherwise on an ACE inhibitor and beta blocker as well as baby aspirin.  She has mild to moderate renal insufficiency with prerenal azotemia on her last labs in January, followed by her primary physician.      ASSESSMENT AND PLAN:  An 78-year-old lady with coronary disease and ongoing risk factors as outlined above.  We have discussed weight loss and she states that she is  going to try to lose weight which she thinks will affect her blood pressure and cholesterol as well as overall risk.  She is certainly correct, however, I have recommended intensification of her statin regimen as well as her blood pressure control in the interim also.  I have discussed a reduced risk of heart attack with goal blood pressure of less than 120/80, also with intensification of her statin regimen for a goal LDL less than 70.  She has verbalized understanding, but does not want to make any changes to her medical regimen at this time nonetheless.   She does agree to try to restrict her sodium intake to 2 to 2 1/2 grams a day or less.  She agrees to be seen in November in followup of the efforts for weight change and risk factor modification and will readdress this at this time.      I do note mode switches on her interrogations and I think this is likely related to brief runs of SVT.  I will touch base with EP in this regard.      Total time is 30 minutes, 25 in coordination of care and counseling.      cc:   [place] [address]         CHRISTINE CONTI MD             D: 2018   T: 2018   MT: ANNABELLE      Name:     BHARAT TREVINO   MRN:      [id number]        Account:      [id number]   :      1940           Service Date: 2018      Document: [id number]         Outpatient Encounter Prescriptions as of 2018   Medication Sig Dispense Refill     aspirin 81 MG tablet Take 1 tablet by mouth daily.       cholecalciferol (VITAMIN D3) 1000 UNIT tablet Take 1 tablet (1,000 Units) by mouth daily       Coenzyme Q10 (COQ-10) 200 MG CAPS Take 1 capsule by mouth daily Take at same time as statin cholesterol medications       lisinopril (PRINIVIL/ZESTRIL) 10 MG tablet Take 1 tablet (10 mg) by mouth 2 times daily 180 tablet 3     metoprolol (LOPRESSOR) 50 MG tablet Take 1 tablet (50 mg) by mouth 2 times daily  180 tablet 3     multivitamin, therapeutic with minerals (MULTI-VITAMIN) TABS tablet Take 1 tablet by mouth daily       nitroglycerin (NITROSTAT) 0.4 MG SL tablet Place 1 tablet (0.4 mg) under the tongue every 5 minutes as needed for chest pain 25 tablet 0     pravastatin (PRAVACHOL) 80 MG tablet Take 1 tablet (80 mg) by mouth daily 90 tablet 3     [DISCONTINUED] sodium chloride (OCEAN) 0.65 % nasal spray Spray 1 spray into both nostrils daily as needed for congestion 1 Bottle 0     No facility-administered encounter medications on file as of 5/24/2018.        Again, thank you for allowing me to participate in the care of your patient.      Sincerely,    Syeda Linda MD     [facility]

## 2018-05-24 NOTE — MR AVS SNAPSHOT
After Visit Summary   5/24/2018    Shadia Gil    MRN: [id number]           Patient Information     Date Of Birth          1940        Visit Information        Provider Department      5/24/2018 9:45 AM LILIA VAZQUEZN [facility]        Today's Diagnoses     Cardiac pacemaker in situ    -  1       Follow-ups after your visit        Your next 10 appointments already scheduled     May 24, 2018 10:45 AM CDT   Return Visit with Syeda Linda MD   [facility] ([facility])    [address]  [facility] [address]   [phone] OPT 2            Aug 30, 2018  3:45 PM CDT   Remote PPM Check with LILIA TECH1   [facility] ([facility])    [id number]  [facility] [address] [phone] OPT 2           This appointment is for a remote check of your pacemaker.  This is not an appointment at the office.              Who to contact     If you have questions or need follow up information about today's clinic visit or your schedule please contact [facility] directly at [phone].  Normal or non-critical lab and imaging results will be communicated to you by MyChart, letter or phone within 4 business days after the clinic has received the results. If you do not hear from us within 7 days, please contact the clinic through MyChart or phone. If you have a critical or abnormal lab result, we will notify you by phone as soon as possible.  Submit refill requests through [employer] or call your pharmacy and they will forward the refill request to us. Please allow 3 business days for your refill to be completed.          Additional Information About Your Visit        Care EveryWhere ID     This is your Care EveryWhere ID. This could be used by other organizations to access your [facility]  records  [id number]         Blood Pressure from Last 3 Encounters:   01/26/18 131/62   11/22/17 132/68   10/11/17 140/77    Weight from Last 3 Encounters:   01/26/18 96.2 kg (212 lb)   11/22/17 95.7 kg (210 lb 14.4 oz)   10/11/17 93.5 kg (206 lb 3.2 oz)              We Performed the Following     PM DEVICE PROGRAMMING EVAL, DUAL LEAD PACER (44212)        Primary Care Provider Office Phone # Fax #    Adryan Martinez [phone]       [address] [facility] [address]        Equal Access to Services     LILY SMALLS : Hadii gertrudis keita hadrachaelo Sooctavio, waaxda luqadaha, qaybta kaalmada adeegyada, dorcas ceballos . So [facility] [phone].    ATENCIÓN: Si habla español, tiene a cavazos disposición servicios gratuitos de asistencia lingüística. Llame al [phone].    We comply with applicable federal civil rights laws and [place] laws. We do not discriminate on the basis of race, color, national origin, age, disability, sex, sexual orientation, or gender identity.            Thank you!     Thank you for choosing [facility]  for your care. Our goal is always to provide you with excellent care. Hearing back from our patients is one way we can continue to improve our services. Please take a few minutes to complete the written survey that you may receive in the mail after your visit with us. Thank you!             Your Updated Medication List - Protect others around you: Learn how to safely use, store and throw away your medicines at www.disposemymeds.org.          This list is accurate as of 5/24/18 10:12 AM.  Always use your most recent med list.                   Brand Name Dispense Instructions for use Diagnosis    aspirin 81 MG tablet      Take 1 tablet by mouth daily.    CAD (coronary artery disease), Essential hypertension, benign       cholecalciferol 1000 UNIT tablet    vitamin D3     Take 1 tablet (1,000 Units) by mouth daily     Vitamin D deficiency       CoQ-10 200 MG Caps      Take 1 capsule by mouth daily Take at same time as statin cholesterol medications    Hyperlipidemia LDL goal <100       lisinopril 10 MG tablet    PRINIVIL/ZESTRIL    180 tablet    Take 1 tablet (10 mg) by mouth 2 times daily    Essential hypertension, benign       metoprolol tartrate 50 MG tablet    LOPRESSOR    180 tablet    Take 1 tablet (50 mg) by mouth 2 times daily    Coronary artery disease involving native coronary artery of native heart without angina pectoris, Essential hypertension, benign       nitroGLYcerin 0.4 MG sublingual tablet    NITROSTAT    25 tablet    Place 1 tablet (0.4 mg) under the tongue every 5 minutes as needed for chest pain    CAD (coronary artery disease)       pravastatin 80 MG tablet    PRAVACHOL    90 tablet    Take 1 tablet (80 mg) by mouth daily    Coronary artery disease involving native coronary artery of native heart without angina pectoris, Hyperlipidemia LDL goal <100       sodium chloride 0.65 % nasal spray    OCEAN    1 Bottle    Spray 1 spray into both nostrils daily as needed for congestion    Epistaxis

## 2018-06-07 ENCOUNTER — TELEPHONE (OUTPATIENT)
Dept: CARDIOLOGY | Facility: CLINIC | Age: 78
End: 2018-06-07

## 2018-06-07 NOTE — TELEPHONE ENCOUNTER
When the mode switches are less than 1 minute and there are no saved EGM's, We are unable to determined what rhythm is, but according to Dr Bingham its not PAF if its less than 1 minute. I hope this helps.

## 2018-08-10 ENCOUNTER — OFFICE VISIT (OUTPATIENT)
Dept: URGENT CARE | Facility: URGENT CARE | Age: 78
End: 2018-08-10
Payer: MEDICARE

## 2018-08-10 VITALS
RESPIRATION RATE: 16 BRPM | HEART RATE: 60 BPM | SYSTOLIC BLOOD PRESSURE: 128 MMHG | OXYGEN SATURATION: 96 % | WEIGHT: 229 LBS | TEMPERATURE: 98 F | BODY MASS INDEX: 41.88 KG/M2 | DIASTOLIC BLOOD PRESSURE: 72 MMHG

## 2018-08-10 DIAGNOSIS — L30.9 ECZEMA, UNSPECIFIED TYPE: Primary | ICD-10-CM

## 2018-08-10 PROCEDURE — 99213 OFFICE O/P EST LOW 20 MIN: CPT | Performed by: PHYSICIAN ASSISTANT

## 2018-08-10 RX ORDER — TRIAMCINOLONE ACETONIDE 1 MG/G
CREAM TOPICAL
Qty: 30 G | Refills: 0 | Status: SHIPPED | OUTPATIENT
Start: 2018-08-10 | End: 2021-01-25

## 2018-08-10 NOTE — PROGRESS NOTES
SUBJECTIVE:  Shadia Gil is a 78 year old female who presents to the clinic today for a rash.  Onset of rash was months ago.   Rash is gradual onset.  Location of the rash: right leg.  Quality/symptoms of rash: itching and red   Symptoms are moderate and rash seems to be stable.  Previous history of a similar rash? Yes: Patient has similar rash on her finger  Recent exposure history: none known    Associated symptoms include: nothing.    Past Medical History:   Diagnosis Date     CAD (coronary artery disease) 1/29/10    CABG x 4; LIMA to the LAD and vein graft to the OM, OM2 and RCA      Essential hypertension, benign      GERD (gastroesophageal reflux disease)      Hyperlipidemia LDL goal <100 7/11/11         Obesity (BMI 30-39.9) 2015    BMI 36.8     Vitamin D deficiency      Current Outpatient Prescriptions   Medication Sig Dispense Refill     cholecalciferol (VITAMIN D3) 1000 UNIT tablet Take 1 tablet (1,000 Units) by mouth daily       Coenzyme Q10 (COQ-10) 200 MG CAPS Take 1 capsule by mouth daily Take at same time as statin cholesterol medications       lisinopril (PRINIVIL/ZESTRIL) 10 MG tablet Take 1 tablet (10 mg) by mouth 2 times daily 180 tablet 3     metoprolol (LOPRESSOR) 50 MG tablet Take 1 tablet (50 mg) by mouth 2 times daily 180 tablet 3     multivitamin, therapeutic with minerals (MULTI-VITAMIN) TABS tablet Take 1 tablet by mouth daily       pravastatin (PRAVACHOL) 80 MG tablet Take 1 tablet (80 mg) by mouth daily 90 tablet 3     triamcinolone (KENALOG) 0.1 % cream Apply sparingly to affected area three times daily for 14 days. 30 g 0     aspirin 81 MG tablet Take 1 tablet by mouth daily.       nitroglycerin (NITROSTAT) 0.4 MG SL tablet Place 1 tablet (0.4 mg) under the tongue every 5 minutes as needed for chest pain (Patient not taking: Reported on 8/10/2018) 25 tablet 0     Social History   Substance Use Topics     Smoking status: Never Smoker     Smokeless tobacco: Never Used      Alcohol use No       ROS:  Review of systems negative except as stated above.    EXAM:   /72  Pulse 60  Temp 98  F (36.7  C) (Oral)  Resp 16  Wt 229 lb (103.9 kg)  SpO2 96%  BMI 41.88 kg/m2  GENERAL: alert, no acute distress.  SKIN: 1) Rash description:    Distribution: localized  Location: right upper thigh dorsal surface  Color: brown,  Lesion type: patch, isolated with henny flaking  2) Rash on finger is erythematous with excoriation and some flaking.   GENERAL APPEARANCE: healthy, alert and no distress  EYES: EOMI,  PERRL, conjunctiva clear  NECK: supple, non-tender to palpation, no adenopathy noted    ASSESSMENT / PLAN:  1. Eczema, unspecified type  Use barrier cream daily on both rashes.   Steroid cream for as least amount of time as possible  Follow-up with DERM if no improvement in symptoms.  - triamcinolone (KENALOG) 0.1 % cream; Apply sparingly to affected area three times daily for 14 days.  Dispense: 30 g; Refill: 0    Hoda Pina PA-C

## 2018-08-10 NOTE — MR AVS SNAPSHOT
After Visit Summary   8/10/2018    Shadia Gil    MRN: [id number]           Patient Information     Date Of Birth          1940        Visit Information        Provider Department      8/10/2018 10:20 AM Hoda Pina PA-C [facility]        Today's Diagnoses     Eczema, unspecified type    -  1      Care Instructions      Managing Atopic Dermatitis (Eczema)     After bathing, gently pat your skin dry (don t rub). Apply moisturizer while your skin is still damp.   To manage your symptoms and help reduce the severity and frequency, try these self-care tips:  Caring for your skin    Use a gentle, fragrance-free cleanser (or nonsoap cleanser) for bathing. Rinse well. Pat skin dry.    Take warm, not hot, baths or showers. Try to limit them to no more that 10 to 15 minutes.     Use moisturizer liberally right after you bathe, while your skin is still damp.    Avoid scratching because it will cause more damage to your skin.     Topical, over-the-counter hydrocortisone cream may help control mild symptoms.   Controlling your environment    Avoid extreme heat or cold.    Avoid very humid or very dry air.    If your home or office air is very dry, use a humidifier.    Avoid allergens, such as dust, that may be present in bedding, carpets, plush toys, or rugs.    Know that pet hair and dander can cause flare-ups.  Seeking medical treatment  Another way to keep symptoms under control is to seek medical treatment. Talk with your healthcare provider about the type of treatment that may work best for you. Your provider may prescribe treatments such as the following:    Topical treatments to put on the skin daily    Medicines taken by mouth (oral medicines), such as antihistamines, antibiotics, or corticosteroids    In severe cases shots (injections) may be needed to control the symptoms. You may even need antibiotics if skin infections occur.  Treatments don t work  the same way for every person. So if your symptoms continue or get worse, ask your healthcare provider about other treatments.  Making lifestyle choices    Manage the stress in your life.    Wear loose-fitting cotton clothing that does not bind or rub your skin.    Avoid contact with wool or other scratchy fabrics.    Use fragrance-free products.  Getting good results  Now that you know more about atopic dermatitis, the next step is up to you. Follow your healthcare provider s treatment plan and your self-care routine. This will help bring atopic dermatitis under control. If your symptoms persist, be sure to let your health care provider know.   Date Last Reviewed: 2/1/2017 2000-2017 [employer]. [address] [place] [address], [place] [address]. All rights reserved. This information is not intended as a substitute for professional medical care. Always follow your healthcare professional's instructions.                Follow-ups after your visit        Your next 10 appointments already scheduled     Aug 30, 2018  3:45 PM CDT   Remote PPM Check with RODRIGUES TECH1   [facility] ([facility])    [address] [place] [address] [phone] OPT 2           This appointment is for a remote check of your pacemaker.  This is not an appointment at the office.              Who to contact     If you have questions or need follow up information about today's clinic visit or your schedule please contact [facility] directly at [phone].  Normal or non-critical lab and imaging results will be communicated to you by MyChart, letter or phone within 4 business days after the clinic has received the results. If you do not hear from us within 7 days, please contact the clinic through MyChart or phone. If you have a critical or abnormal lab result, we will notify you by phone as soon as possible.  Submit refill requests through  Umair or call your pharmacy and they will forward the refill request to us. Please allow 3 business days for your refill to be completed.          Additional Information About Your Visit        Care EveryWhere ID     This is your Care EveryWhere ID. This could be used by other organizations to access your [facility] medical records  [id number]        Your Vitals Were     Pulse Temperature Respirations Pulse Oximetry BMI (Body Mass Index)       60 98  F (36.7  C) (Oral) 16 96% 41.88 kg/m2        Blood Pressure from Last 3 Encounters:   08/10/18 128/72   05/24/18 140/70   01/26/18 131/62    Weight from Last 3 Encounters:   08/10/18 229 lb (103.9 kg)   05/24/18 226 lb (102.5 kg)   01/26/18 212 lb (96.2 kg)              Today, you had the following     No orders found for display         Today's Medication Changes          These changes are accurate as of 8/10/18 10:42 AM.  If you have any questions, ask your nurse or doctor.               Start taking these medicines.        Dose/Directions    triamcinolone 0.1 % cream   Commonly known as:  KENALOG   Used for:  Eczema, unspecified type   Started by:  Hoda Pina PA-C        Apply sparingly to affected area three times daily for 14 days.   Quantity:  30 g   Refills:  0            Where to get your medicines      These medications were sent to [facility] [address] [address]     Phone:  [phone]     triamcinolone 0.1 % cream                Primary Care Provider Office Phone # Fax #    Adryan Martinez [phone]       [address] [place] [address]        Equal Access to Services     [facility] : Hadii gertrudis Rajput, waaxda luqadaha, qaybta kaalmada cornell, dorcas linares. So [facility] [phone].    ATENCIÓN: Si habla español, tiene a cavazos disposición servicios gratuitos de asistencia lingüística. Llame al  [phone].    We comply with applicable federal civil rights laws and [place] laws. We do not discriminate on the basis of race, color, national origin, age, disability, sex, sexual orientation, or gender identity.            Thank you!     Thank you for choosing [facility]  for your care. Our goal is always to provide you with excellent care. Hearing back from our patients is one way we can continue to improve our services. Please take a few minutes to complete the written survey that you may receive in the mail after your visit with us. Thank you!             Your Updated Medication List - Protect others around you: Learn how to safely use, store and throw away your medicines at www.disposemymeds.org.          This list is accurate as of 8/10/18 10:42 AM.  Always use your most recent med list.                   Brand Name Dispense Instructions for use Diagnosis    aspirin 81 MG tablet      Take 1 tablet by mouth daily.    CAD (coronary artery disease), Essential hypertension, benign       cholecalciferol 1000 UNIT tablet    vitamin D3     Take 1 tablet (1,000 Units) by mouth daily    Vitamin D deficiency       CoQ-10 200 MG Caps      Take 1 capsule by mouth daily Take at same time as statin cholesterol medications    Hyperlipidemia LDL goal <100       lisinopril 10 MG tablet    PRINIVIL/ZESTRIL    180 tablet    Take 1 tablet (10 mg) by mouth 2 times daily    Essential hypertension, benign       metoprolol tartrate 50 MG tablet    LOPRESSOR    180 tablet    Take 1 tablet (50 mg) by mouth 2 times daily    Coronary artery disease involving native coronary artery of native heart without angina pectoris, Essential hypertension, benign       Multi-vitamin Tabs tablet      Take 1 tablet by mouth daily        nitroGLYcerin 0.4 MG sublingual tablet    NITROSTAT    25 tablet    Place 1 tablet (0.4 mg) under the tongue every 5 minutes as needed for chest pain    CAD (coronary artery disease)        pravastatin 80 MG tablet    PRAVACHOL    90 tablet    Take 1 tablet (80 mg) by mouth daily    Coronary artery disease involving native coronary artery of native heart without angina pectoris, Hyperlipidemia LDL goal <100       triamcinolone 0.1 % cream    KENALOG    30 g    Apply sparingly to affected area three times daily for 14 days.    Eczema, unspecified type

## 2018-08-10 NOTE — PATIENT INSTRUCTIONS
Managing Atopic Dermatitis (Eczema)     After bathing, gently pat your skin dry (don t rub). Apply moisturizer while your skin is still damp.   To manage your symptoms and help reduce the severity and frequency, try these self-care tips:  Caring for your skin    Use a gentle, fragrance-free cleanser (or nonsoap cleanser) for bathing. Rinse well. Pat skin dry.    Take warm, not hot, baths or showers. Try to limit them to no more that 10 to 15 minutes.     Use moisturizer liberally right after you bathe, while your skin is still damp.    Avoid scratching because it will cause more damage to your skin.     Topical, over-the-counter hydrocortisone cream may help control mild symptoms.   Controlling your environment    Avoid extreme heat or cold.    Avoid very humid or very dry air.    If your home or office air is very dry, use a humidifier.    Avoid allergens, such as dust, that may be present in bedding, carpets, plush toys, or rugs.    Know that pet hair and dander can cause flare-ups.  Seeking medical treatment  Another way to keep symptoms under control is to seek medical treatment. Talk with your healthcare provider about the type of treatment that may work best for you. Your provider may prescribe treatments such as the following:    Topical treatments to put on the skin daily    Medicines taken by mouth (oral medicines), such as antihistamines, antibiotics, or corticosteroids    In severe cases shots (injections) may be needed to control the symptoms. You may even need antibiotics if skin infections occur.  Treatments don t work the same way for every person. So if your symptoms continue or get worse, ask your healthcare provider about other treatments.  Making lifestyle choices    Manage the stress in your life.    Wear loose-fitting cotton clothing that does not bind or rub your skin.    Avoid contact with wool or other scratchy fabrics.    Use fragrance-free products.  Getting good results  Now that you  know more about atopic dermatitis, the next step is up to you. Follow your healthcare provider s treatment plan and your self-care routine. This will help bring atopic dermatitis under control. If your symptoms persist, be sure to let your health care provider know.   Date Last Reviewed: 2/1/2017 2000-2017 [employer]. [address] [place] [address], [place] [address]. All rights reserved. This information is not intended as a substitute for professional medical care. Always follow your healthcare professional's instructions.

## 2018-09-11 ENCOUNTER — DOCUMENTATION ONLY (OUTPATIENT)
Dept: CARDIOLOGY | Facility: CLINIC | Age: 78
End: 2018-09-11

## 2018-09-11 NOTE — PROGRESS NOTES
Patient missed Carelink transmission on 8/30/18. Sent letter 8/31, no response. Sent 1 week letter today

## 2018-10-22 ENCOUNTER — RADIANT APPOINTMENT (OUTPATIENT)
Dept: MAMMOGRAPHY | Facility: CLINIC | Age: 78
End: 2018-10-22
Attending: INTERNAL MEDICINE
Payer: MEDICARE

## 2018-10-22 DIAGNOSIS — Z12.31 VISIT FOR SCREENING MAMMOGRAM: ICD-10-CM

## 2018-10-22 PROCEDURE — 77067 SCR MAMMO BI INCL CAD: CPT | Mod: TC

## 2018-11-01 ENCOUNTER — ALLIED HEALTH/NURSE VISIT (OUTPATIENT)
Dept: CARDIOLOGY | Facility: CLINIC | Age: 78
End: 2018-11-01
Payer: MEDICARE

## 2018-11-01 DIAGNOSIS — Z95.0 CARDIAC PACEMAKER IN SITU: Primary | ICD-10-CM

## 2018-11-01 PROCEDURE — 93296 REM INTERROG EVL PM/IDS: CPT | Performed by: INTERNAL MEDICINE

## 2018-11-01 PROCEDURE — 93294 REM INTERROG EVL PM/LDLS PM: CPT | Performed by: INTERNAL MEDICINE

## 2018-11-01 NOTE — MR AVS SNAPSHOT
After Visit Summary   11/1/2018    Shadia Gil    MRN: [id number]           Patient Information     Date Of Birth          1940        Visit Information        Provider Department      11/1/2018 3:15 PM LILIA COLMENARES [facility]        Today's Diagnoses     Cardiac pacemaker in situ    -  1       Follow-ups after your visit        Your next 10 appointments already scheduled     Feb 12, 2019 11:45 AM CST   Remote PPM Check with LILIA COLMENARES   [facility] ([facility])    [address] [place], [place] [address]  [facility] [address] [phone] OPT 2           This appointment is for a remote check of your pacemaker.  This is not an appointment at the office.              Who to contact     If you have questions or need follow up information about today's clinic visit or your schedule please contact [facility] directly at [phone].  Normal or non-critical lab and imaging results will be communicated to you by MyChart, letter or phone within 4 business days after the clinic has received the results. If you do not hear from us within 7 days, please contact the clinic through MyChart or phone. If you have a critical or abnormal lab result, we will notify you by phone as soon as possible.  Submit refill requests through twidox or call your pharmacy and they will forward the refill request to us. Please allow 3 business days for your refill to be completed.          Additional Information About Your Visit        Care EveryWhere ID     This is your Care EveryWhere ID. This could be used by other organizations to access your [facility] medical records  [id number]         Blood Pressure from Last 3 Encounters:   08/10/18 128/72   05/24/18 140/70   01/26/18 131/62    Weight from Last 3 Encounters:   08/10/18 103.9 kg (229 lb)   05/24/18 102.5 kg (226 lb)   01/26/18 96.2 kg (212 lb)               We Performed the Following     INTERROGATION DEVICE EVAL REMOTE, PACER/ICD (84376)     PM DEVICE INTERROGATE REMOTE (31083)        Primary Care Provider Office Phone # Fax #    Adryan Martinez [phone]       [address] [facility] [address]        Equal Access to Services     LILY SMALLS : Hadii aad ku hadasho Soomaali, waaxda luqadaha, qaybta kaalmada adeegyada, waxay idiin hayaan adeeg khepish la'aan . So [facility] [phone].    ATENCIÓN: Si habla español, tiene a cavazos disposición servicios gratuitos de asistencia lingüística. Llame al [phone].    We comply with applicable federal civil rights laws and [place] laws. We do not discriminate on the basis of race, color, national origin, age, disability, sex, sexual orientation, or gender identity.            Thank you!     Thank you for choosing [facility]  for your care. Our goal is always to provide you with excellent care. Hearing back from our patients is one way we can continue to improve our services. Please take a few minutes to complete the written survey that you may receive in the mail after your visit with us. Thank you!             Your Updated Medication List - Protect others around you: Learn how to safely use, store and throw away your medicines at www.disposemymeds.org.          This list is accurate as of 11/1/18  3:41 PM.  Always use your most recent med list.                   Brand Name Dispense Instructions for use Diagnosis    aspirin 81 MG tablet      Take 1 tablet by mouth daily.    CAD (coronary artery disease), Essential hypertension, benign       cholecalciferol 1000 UNIT tablet    vitamin D3     Take 1 tablet (1,000 Units) by mouth daily    Vitamin D deficiency       CoQ-10 200 MG Caps      Take 1 capsule by mouth daily Take at same time as statin cholesterol medications    Hyperlipidemia LDL goal <100       lisinopril 10 MG tablet    PRINIVIL/ZESTRIL     180 tablet    Take 1 tablet (10 mg) by mouth 2 times daily    Essential hypertension, benign       metoprolol tartrate 50 MG tablet    LOPRESSOR    180 tablet    Take 1 tablet (50 mg) by mouth 2 times daily    Coronary artery disease involving native coronary artery of native heart without angina pectoris, Essential hypertension, benign       Multi-vitamin Tabs tablet      Take 1 tablet by mouth daily        nitroGLYcerin 0.4 MG sublingual tablet    NITROSTAT    25 tablet    Place 1 tablet (0.4 mg) under the tongue every 5 minutes as needed for chest pain    CAD (coronary artery disease)       pravastatin 80 MG tablet    PRAVACHOL    90 tablet    Take 1 tablet (80 mg) by mouth daily    Coronary artery disease involving native coronary artery of native heart without angina pectoris, Hyperlipidemia LDL goal <100       triamcinolone 0.1 % cream    KENALOG    30 g    Apply sparingly to affected area three times daily for 14 days.    Eczema, unspecified type

## 2018-11-01 NOTE — PROGRESS NOTES
Medtronic Adapta (D) Remote PPM Device Check  AP: 90 % : <1 %  Mode: AAI<->DDD        Presenting Rhythm: AP/VS  Heart Rate: Adequate rates per histogram  Sensing: Stable    Pacing Threshold: Stable    Impedance: Stable  Battery Status: 8.5-12.5 years  Atrial Arrhythmia: 1 brief mode switch episode, no EGM  Ventricular Arrhythmia: None     Care Plan: F/u PPM Carelink q 3 months. Gave patient results in clinic. RanjitCVT

## 2018-12-02 DIAGNOSIS — I10 ESSENTIAL HYPERTENSION, BENIGN: ICD-10-CM

## 2018-12-02 DIAGNOSIS — E78.5 HYPERLIPIDEMIA LDL GOAL <100: ICD-10-CM

## 2018-12-02 DIAGNOSIS — I25.10 CORONARY ARTERY DISEASE INVOLVING NATIVE CORONARY ARTERY OF NATIVE HEART WITHOUT ANGINA PECTORIS: ICD-10-CM

## 2018-12-02 NOTE — LETTER
[facility]  [address] [id number] [phone]            Shadia Gil  [address]   [facility] [address]        December 4, 2018    Dear Shadia,    While refilling your prescription today, we noticed that you are due for an appointment with your provider.  We will refill your prescription for 30 days, but a follow-up appointment must be made before any additional refills can be approved.     Taking care of your health is important to us and we look forward to seeing you in the near future.  Please call us at [phone] or [phone] (or use abusix) to schedule an appointment.     Please disregard this notice if you have already made an appointment.    Sincerely,        [facility]

## 2018-12-03 NOTE — TELEPHONE ENCOUNTER
"Requested Prescriptions   Pending Prescriptions Disp Refills     pravastatin (PRAVACHOL) 80 MG tablet [Pharmacy Med Name: PRAVASTATIN SODIUM 80MG TABS]  Last Written Prescription Date:  11/22/2017  Last Fill Quantity: 90,  # refills: 03   Last Office Visit: 11/22/2017   Future Office Visit:      90 tablet 3     Sig: TAKE ONE TABLET BY MOUTH EVERY DAY    Statins Protocol Failed    12/2/2018  7:10 PM       Failed - LDL on file in past 12 months    Recent Labs   Lab Test  11/22/17   1041   LDL  81            Failed - Recent (12 mo) or future (30 days) visit within the authorizing provider's specialty    Patient had office visit in the last 12 months or has a visit in the next 30 days with authorizing provider or within the authorizing provider's specialty.  See \"Patient Info\" tab in inbasket, or \"Choose Columns\" in Meds & Orders section of the refill encounter.             Passed - No abnormal creatine kinase in past 12 months    No lab results found.            Passed - Patient is age 18 or older       Passed - No active pregnancy on record       Passed - No positive pregnancy test in past 12 months        lisinopril (PRINIVIL/ZESTRIL) 10 MG tablet [Pharmacy Med Name: LISINOPRIL 10MG TABS]  Last Written Prescription Date:  11/22/2017  Last Fill Quantity: 180,  # refills: 03   Last Office Visit: 11/22/2017   Future Office Visit:      180 tablet 3     Sig: TAKE ONE TABLET BY MOUTH TWICE A DAY    ACE Inhibitors (Including Combos) Protocol Failed    12/2/2018  7:10 PM       Failed - Recent (12 mo) or future (30 days) visit within the authorizing provider's specialty    Patient had office visit in the last 12 months or has a visit in the next 30 days with authorizing provider or within the authorizing provider's specialty.  See \"Patient Info\" tab in inbasket, or \"Choose Columns\" in Meds & Orders section of the refill encounter.             Failed - Normal serum creatinine on file in past 12 months    Recent Labs   Lab Test " " 01/26/18   1740   CR  1.18*            Passed - Blood pressure under 140/90 in past 12 months    BP Readings from Last 3 Encounters:   08/10/18 128/72   05/24/18 140/70   01/26/18 131/62                Passed - Patient is age 18 or older       Passed - No active pregnancy on record       Passed - Normal serum potassium on file in past 12 months    Recent Labs   Lab Test  01/26/18   1740   POTASSIUM  4.6            Passed - No positive pregnancy test in past 12 months        metoprolol tartrate (LOPRESSOR) 50 MG tablet [Pharmacy Med Name: METOPROLOL TARTRATE 50MG TABS]  Last Written Prescription Date:  11/22/2017  Last Fill Quantity: 180,  # refills: 03   Last Office Visit: 11/22/2017   Future Office Visit:      180 tablet 3     Sig: TAKE ONE TABLET BY MOUTH TWICE A DAY    Beta-Blockers Protocol Failed    12/2/2018  7:10 PM       Failed - Recent (12 mo) or future (30 days) visit within the authorizing provider's specialty    Patient had office visit in the last 12 months or has a visit in the next 30 days with authorizing provider or within the authorizing provider's specialty.  See \"Patient Info\" tab in inbasket, or \"Choose Columns\" in Meds & Orders section of the refill encounter.             Passed - Blood pressure under 140/90 in past 12 months    BP Readings from Last 3 Encounters:   08/10/18 128/72   05/24/18 140/70   01/26/18 131/62                Passed - Patient is age 6 or older          "

## 2018-12-04 RX ORDER — LISINOPRIL 10 MG/1
TABLET ORAL
Qty: 60 TABLET | Refills: 0 | Status: SHIPPED | OUTPATIENT
Start: 2018-12-04 | End: 2018-12-19

## 2018-12-04 RX ORDER — PRAVASTATIN SODIUM 80 MG/1
TABLET ORAL
Qty: 30 TABLET | Refills: 0 | Status: SHIPPED | OUTPATIENT
Start: 2018-12-04 | End: 2018-12-19

## 2018-12-04 RX ORDER — METOPROLOL TARTRATE 50 MG
TABLET ORAL
Qty: 60 TABLET | Refills: 0 | Status: SHIPPED | OUTPATIENT
Start: 2018-12-04 | End: 2018-12-19

## 2018-12-04 NOTE — TELEPHONE ENCOUNTER
Medication is being filled for 1 time refill only due to:  Patient needs to be seen because it has been more than one year since last visit.  letter sent

## 2018-12-19 ENCOUNTER — OFFICE VISIT (OUTPATIENT)
Dept: INTERNAL MEDICINE | Facility: CLINIC | Age: 78
End: 2018-12-19
Payer: MEDICARE

## 2018-12-19 VITALS
TEMPERATURE: 97.5 F | RESPIRATION RATE: 18 BRPM | HEART RATE: 60 BPM | SYSTOLIC BLOOD PRESSURE: 136 MMHG | OXYGEN SATURATION: 96 % | BODY MASS INDEX: 42.2 KG/M2 | DIASTOLIC BLOOD PRESSURE: 70 MMHG | WEIGHT: 230.7 LBS

## 2018-12-19 DIAGNOSIS — I10 ESSENTIAL HYPERTENSION, BENIGN: ICD-10-CM

## 2018-12-19 DIAGNOSIS — E78.5 HYPERLIPIDEMIA LDL GOAL <100: ICD-10-CM

## 2018-12-19 DIAGNOSIS — I25.10 CORONARY ARTERY DISEASE INVOLVING NATIVE CORONARY ARTERY OF NATIVE HEART WITHOUT ANGINA PECTORIS: Primary | ICD-10-CM

## 2018-12-19 DIAGNOSIS — Z78.0 ASYMPTOMATIC POSTMENOPAUSAL STATUS: ICD-10-CM

## 2018-12-19 PROCEDURE — 99214 OFFICE O/P EST MOD 30 MIN: CPT | Performed by: INTERNAL MEDICINE

## 2018-12-19 RX ORDER — LISINOPRIL 10 MG/1
TABLET ORAL
Qty: 270 TABLET | Refills: 3 | Status: SHIPPED | OUTPATIENT
Start: 2018-12-19 | End: 2020-01-02

## 2018-12-19 RX ORDER — PRAVASTATIN SODIUM 80 MG/1
80 TABLET ORAL DAILY
Qty: 90 TABLET | Refills: 3 | Status: SHIPPED | OUTPATIENT
Start: 2018-12-19 | End: 2019-12-23

## 2018-12-19 RX ORDER — METOPROLOL TARTRATE 50 MG
50 TABLET ORAL 2 TIMES DAILY
Qty: 180 TABLET | Refills: 3 | Status: SHIPPED | OUTPATIENT
Start: 2018-12-19 | End: 2020-01-01

## 2018-12-19 NOTE — PATIENT INSTRUCTIONS
"*  Blood pressure needs better control    *  Increae the Lisinopril to 209 mg in the morning, 10 mg in the evening    *  Conitnue Metoprolol 50 mg twice per day    *  Be sure to take the Pravastatin 80 mg ocne per day.     * STay active, walk every day if possible.      *  Get tetatnus vaccine booster today in the [facility].     *  Return for a \"lab only appointment\" in the next few weeks, sooner if needed.  Please get these labs done in a fasting state with nothing to eat for at least 8 hours prior to getting labs drawn.  I will make contact regarding the results and any recommendations once I have reviewed them.     *  Return to see me in 1 year, sooner if needed.  Call [phone] to schedule this appointment.       5 GOALS TO PREVENT VASCULAR DISEASE:     1.  Aggressive blood pressure control, under 130/80 ideally.  Using medications if needed.    Your blood pressure is under good control    BP Readings from Last 4 Encounters:   12/19/18 136/70   08/10/18 128/72   05/24/18 140/70   01/26/18 131/62       2.  Aggressive LDL cholesterol (\"bad cholesterol\") lowering as indicated.    Your goal is an LDL under 130 for sure, preferably under 100.  (If you have diabetes or previous vascular disease, the the LDL goals would be under 100 for sure, preferably under 70.)    New guidelines identify four high-risk groups who could benefit from statins:   *people with pre-existing heart disease, such as those who have had a heart attack;   *people ages 40 to 75 who have diabetes of any type  *patients ages 40 to 75 with at least a 7.5% risk of developing cardiovascular disease over the next decade, according to a formula described in the guidelines  *patients with the sort of super-high cholesterol that sometimes runs in families, as evidenced by an LDL of 190 milligrams per deciliter or higher    Your cholesterol levels are well controlled.    Recent Labs   Lab Test 11/22/17  1041 09/21/16  0828 09/30/15  0910 " "09/16/14  0844   CHOL 184 157 177 167   HDL 68 60 62 49*   LDL 81 78 89 82   TRIG 177* 96 128 182*   CHOLHDLRATIO  --   --  2.9 3.4       3.  Aggressive diabetic prevention, screening and/or management.      You do not have diabetes as of the most recent blood tests.     4.  No smoking    5.  Daily aspirin: Have a discussion about the relative benefits and risks of taking daily low dose aspirin (81 mg) tablet once per day over the age of 50, unless there is a specific reason that you cannot take aspirin (such as side effect, allergy, or you are on another \"blood thinner\").        --Based on your current cardiac risk factors, you should take Aspirin 81 mg once per day, unless you have any reasons (side effects, intolerance, etc.) that you cannot take aspirin.         Preventive Health Recommendations  Female Ages 75+    Yearly exam: See your health care provider every year in order to  o Review health changes.   o Discuss preventive care.    o Review your medicines if your doctor has prescribed any.      Get a Pap test every three years (unless you have an abnormal result and your provider advises testing more often).    No more PAP smear needed.      You do not need a Pap test if your uterus was removed (hysterectomy) and you have not had cancer.    You should be tested each year for STDs (sexually transmitted diseases) if you're at risk.     Routine screening mammogram no longer indicated.    No routine screening colonoscopy indiacted     Have a cholesterol test every 5 years, or more often if advised.    Have a diabetes test (fasting glucose) every three years. If you are at risk for diabetes, you should have this test more often.     If you are at risk for osteoporosis (brittle bone disease), think about having a bone density scan (DEXA).    Shots:   *  Get a flu shot each year.   *  Get a tetanus shot every 10 years.    *  Pneumonia vaccine up to date.      Nutrition:     Eat at least 5 servings of fruits and " "vegetables each day.    Eat whole-grain bread, whole-wheat pasta and brown rice instead of white grains and rice.    Talk to your provider about Calcium and Vitamin D.    --Calcium: aim for 1200 mg per day (any brand is fine)   --Vitamin D3 at least 1000 units per day (any brand is fine)       --Good Grains:  Oats, brown rice, Quinoa (these do not raise the blood sugar as much)     --Bad grains:  Anything made from wheat or white rice     (because these raise the blood sugars significantly, and the possible gluten issue from wheat for some people).      --Proteins:  Aim for \"lean proteins\" including chicken, fish, seafood, pork, turkey, and eggs (in moderation); Eat red meat only occasionally        Lifestyle    Exercise at least 150 minutes a week (30 minutes a day, 5 days a week). This will help you control your weight and prevent disease.    Limit alcohol to one drink per day.    No smoking.     Wear sunscreen to prevent skin cancer.     See your dentist every six months for an exam and cleaning.    See your eye doctor every 1 to 2 years.        "

## 2018-12-19 NOTE — PROGRESS NOTES
SUBJECTIVE:   Shadia Gil is a 78 year old female who presents to clinic today for the following health issues:    Hyperlipidemia Follow-Up      Rate your low fat/cholesterol diet?: good    Taking statin?  Yes, no muscle aches from statin    Other lipid medications/supplements?:  none    Hypertension Follow-up      Outpatient blood pressures are not being checked.    Low Salt Diet: not monitoring salt    Pt would like eczema checked out on abdominal area.        Problem list and histories reviewed & adjusted, as indicated.  Additional history: as documented        Reviewed and updated as needed this visit by clinical staff  Tobacco  Allergies  Meds  Med Hx  Surg Hx  Fam Hx  Soc Hx      Reviewed and updated as needed this visit by Provider  Tobacco  Allergies           Past Medical History:  ---------------------------  Past Medical History:   Diagnosis Date     CAD (coronary artery disease) 1/29/10    CABG x 4; LIMA to the LAD and vein graft to the OM, OM2 and RCA      Essential hypertension, benign      GERD (gastroesophageal reflux disease)      Hyperlipidemia LDL goal <100 7/11/11         Obesity (BMI 30-39.9) 2015    BMI 36.8     Vitamin D deficiency        Past Surgical History:  ---------------------------  Past Surgical History:   Procedure Laterality Date     CORONARY ARTERY BYPASS  1/29/10    4 vessel CABG at [facility]; Four-vessel coronary artery bypass, beating heart technique.  Endoscopic vein harvest using the saphenous vein for the distal right, saphenous vein for the first and second marginal branches of the circumflex, left internal mammary artery for the LAD     IMPLANT PACEMAKER  11/2012    Dual Chamber     LAPAROSCOPIC CHOLECYSTECTOMY N/A 2/2/2016    Procedure: LAPAROSCOPIC CHOLECYSTECTOMY;  Surgeon: Maurilio Burger MD;  Location: [facility] OR       Current Medications:  ---------------------------  Current Outpatient Medications   Medication Sig Dispense Refill     aspirin 81  "MG tablet Take 1 tablet by mouth daily.       Coenzyme Q10 (COQ-10) 200 MG CAPS Take 1 capsule by mouth daily Take at same time as statin cholesterol medications       lisinopril (PRINIVIL/ZESTRIL) 10 MG tablet Take 2 tablets (20 mg) by mouth every morning AND 1 tablet (10 mg) every evening. 270 tablet 3     metoprolol tartrate (LOPRESSOR) 50 MG tablet Take 1 tablet (50 mg) by mouth 2 times daily 180 tablet 3     multivitamin, therapeutic with minerals (MULTI-VITAMIN) TABS tablet Take 1 tablet by mouth daily       pravastatin (PRAVACHOL) 80 MG tablet Take 1 tablet (80 mg) by mouth daily 90 tablet 3     cholecalciferol (VITAMIN D3) 1000 UNIT tablet Take 1 tablet (1,000 Units) by mouth daily       nitroglycerin (NITROSTAT) 0.4 MG SL tablet Place 1 tablet (0.4 mg) under the tongue every 5 minutes as needed for chest pain (Patient not taking: Reported on 8/10/2018) 25 tablet 0     triamcinolone (KENALOG) 0.1 % cream Apply sparingly to affected area three times daily for 14 days. (Patient not taking: Reported on 12/19/2018) 30 g 0       Allergies:  -------------  Allergies   Allergen Reactions     Atorvastatin Other (See Comments)     Myalgias, dizziness     Codeine      States \"there was only a question about it\"     Penicillins      States,\" there is a question about it\"       Social History:  -------------------  Social History     Socioeconomic History     Marital status:      Spouse name: Not on file     Number of children: Not on file     Years of education: Not on file     Highest education level: Not on file   Social Needs     Financial resource strain: Not on file     Food insecurity - worry: Not on file     Food insecurity - inability: Not on file     Transportation needs - medical: Not on file     Transportation needs - non-medical: Not on file   Occupational History     Not on file   Tobacco Use     Smoking status: Never Smoker     Smokeless tobacco: Never Used   Substance and Sexual Activity     " Alcohol use: No     Alcohol/week: 0.0 oz     Drug use: No     Sexual activity: Not Currently   Other Topics Concern     Parent/sibling w/ CABG, MI or angioplasty before 65F 55M? Not Asked      Service Not Asked     Blood Transfusions Not Asked     Caffeine Concern Yes     Comment: tea occasional      Occupational Exposure Not Asked     Hobby Hazards Not Asked     Sleep Concern Not Asked     Stress Concern Not Asked     Weight Concern Not Asked     Special Diet Not Asked     Back Care Not Asked     Exercise No     Bike Helmet Not Asked     Seat Belt Not Asked     Self-Exams Not Asked   Social History Narrative     Not on file       Family Medical History:  ------------------------------  Family History   Problem Relation Age of Onset     Cancer Mother      Heart Disease Brother      C.A.D. Brother          ROS:  REVIEW OF SYSTEMS:    RESP: negative for cough, dyspnea, wheezing, hemoptysis  CV: negative for chest pain, palpitations, PND, CAVAZOS, orthopnea; reports no changes in their ability to perform physical activity (from cardiovascular standpoint)  GI: negative for dysphagia, N/V, pain, melena, diarrhea and constipation  NEURO: negative for numbness/tingling, paralysis, incoordination, or focal weakness     OBJECTIVE:                                                    /70   Pulse 60   Temp 97.5  F (36.4  C) (Oral)   Resp 18   Wt 104.6 kg (230 lb 11.2 oz)   SpO2 96%   Breastfeeding? No   BMI 42.20 kg/m       GENERAL alert and no distress  EYES:  Normal sclera,conjunctiva, EOMI  HENT: oral and posterior pharynx without lesions or erythema, facies symmetric  NECK: Neck supple. No LAD, without thyroidmegaly or JVD., Carotids without bruits.  RESP: Clear to ausculation bilaterally without wheezes or crackles. Normal BS in all fields.  CV: RRR normal S1S2 without murmurs, rubs or gallops. PMI normal  LYMPH: no cervical lymph adenopathy appreciated  MS: extremities- no gross deformities of the visible  extremities noted, no edema  PSYCH: Alert and oriented times 3; speech- coherent  SKIN:  No obvious significant skin lesions on visible portions of face          ASSESSMENT/PLAN:                                                      (I25.10) Coronary artery disease involving native coronary artery of native heart without angina pectoris  (primary encounter diagnosis)  Comment: The patient does not report any signs or symptoms of angina or active cardiac ischemia.   They do not report any relative changes in their ability to perform physical activities over the past year.    We discussed aggressive secondary risk factor modification, including aggressive BP control (under 130/ideally), aggressive LDL lowering with statin (goal under 100 for sure/under 70 ideally), no smoking, diabetes prevention/management, no smoking, and use of either ASA or similar anti platelet agent if tolerated.     Plan: Lipid panel reflex to direct LDL Fasting,         Comprehensive metabolic panel, CBC with         platelets, pravastatin (PRAVACHOL) 80 MG         tablet, metoprolol tartrate (LOPRESSOR) 50 MG         tablet            (Z78.0) Asymptomatic postmenopausal status  Comment:   Plan:     (E78.5) Hyperlipidemia LDL goal <100  Comment: Discussed new guidelines recommending a statin cholesterol lowering medication for any patient with either diabetes and/or vascular disease, aiming for a LDL goal under 100 for sure, ideally under 70.    Reviewed statins and their side effects including muscle pain, muscle inflammation, GI upset.  Told the patient to stop the medication in question and to call if any side effects develop.   Recommended CoQ10 200-300 mg at the same time as taking the statin medication to help reduce the chance of muscle side effects from the statin.    Plan: Lipid panel reflex to direct LDL Fasting,         Comprehensive metabolic panel, pravastatin         (PRAVACHOL) 80 MG tablet            (I10) Essential  hypertension, benign  Comment: This condition is currently controlled on the current medical regimen.  Continue current therapy.   Discussed current hypertension treatment guidelines, including indications for treatment and treatment options.  Discussed the importance for aggressive management of HTN to prevent vascular complications later.  Recommended lower fat, lower carbohydrate, and lower sodium (<2000 mg)diet.  Discussed required intervals for follow up on HTN, lab studies.  Recommened pt. follow their blood pressures outside the clinic to ensure that BPs are remaining within guidelines, and to contact me if the readings are not within guidelines on a regular basis so we can adjust treatment as needed.   Plan: Comprehensive metabolic panel, CBC with         platelets, metoprolol tartrate (LOPRESSOR) 50         MG tablet, lisinopril (PRINIVIL/ZESTRIL) 10 MG         tablet               See Patient Instructions    ISADORA KELLEY M.D., MD  [facility]    (Chart documentation may have been completed, in part, with [employer] voice-recognition software. Even though reviewed, some grammatical, spelling, and word errors may remain.)

## 2018-12-19 NOTE — LETTER
[facility]  [address] [address]  [phone]        March 5, 2019    Shadia Gil  [address]   [place] [facility] [place] [address]              Dear Shadia Gil    This is to remind you that your FASTING LABS is due.    You may call our office at [phone] to schedule an appointment.    Please disregard this notice if you have already had your labs drawn or made an appointment.        Sincerely,        Adryan Martinez MD

## 2019-02-12 ENCOUNTER — ANCILLARY PROCEDURE (OUTPATIENT)
Dept: CARDIOLOGY | Facility: CLINIC | Age: 79
End: 2019-02-12
Attending: INTERNAL MEDICINE
Payer: COMMERCIAL

## 2019-02-12 DIAGNOSIS — I49.5 SINUS NODE DYSFUNCTION (H): ICD-10-CM

## 2019-02-12 PROCEDURE — 93294 REM INTERROG EVL PM/LDLS PM: CPT | Performed by: INTERNAL MEDICINE

## 2019-02-12 PROCEDURE — 93296 REM INTERROG EVL PM/IDS: CPT | Performed by: INTERNAL MEDICINE

## 2019-02-16 LAB
MDC_IDC_LEAD_IMPLANT_DT: NORMAL
MDC_IDC_LEAD_IMPLANT_DT: NORMAL
MDC_IDC_LEAD_LOCATION: NORMAL
MDC_IDC_LEAD_LOCATION: NORMAL
MDC_IDC_LEAD_LOCATION_DETAIL_1: NORMAL
MDC_IDC_LEAD_LOCATION_DETAIL_1: NORMAL
MDC_IDC_LEAD_MFG: NORMAL
MDC_IDC_LEAD_MFG: NORMAL
MDC_IDC_LEAD_MODEL: NORMAL
MDC_IDC_LEAD_MODEL: NORMAL
MDC_IDC_LEAD_POLARITY_TYPE: NORMAL
MDC_IDC_LEAD_POLARITY_TYPE: NORMAL
MDC_IDC_LEAD_SERIAL: NORMAL
MDC_IDC_LEAD_SERIAL: NORMAL
MDC_IDC_MSMT_BATTERY_DTM: NORMAL
MDC_IDC_MSMT_BATTERY_IMPEDANCE: 254 OHM
MDC_IDC_MSMT_BATTERY_REMAINING_LONGEVITY: 127 MO
MDC_IDC_MSMT_BATTERY_STATUS: NORMAL
MDC_IDC_MSMT_BATTERY_VOLTAGE: 2.79 V
MDC_IDC_MSMT_LEADCHNL_RA_IMPEDANCE_VALUE: 402 OHM
MDC_IDC_MSMT_LEADCHNL_RA_PACING_THRESHOLD_AMPLITUDE: 0.62 V
MDC_IDC_MSMT_LEADCHNL_RA_PACING_THRESHOLD_PULSEWIDTH: 0.4 MS
MDC_IDC_MSMT_LEADCHNL_RV_IMPEDANCE_VALUE: 363 OHM
MDC_IDC_MSMT_LEADCHNL_RV_PACING_THRESHOLD_AMPLITUDE: 0.5 V
MDC_IDC_MSMT_LEADCHNL_RV_PACING_THRESHOLD_PULSEWIDTH: 0.4 MS
MDC_IDC_PG_IMPLANT_DTM: NORMAL
MDC_IDC_PG_MFG: NORMAL
MDC_IDC_PG_MODEL: NORMAL
MDC_IDC_PG_SERIAL: NORMAL
MDC_IDC_PG_TYPE: NORMAL
MDC_IDC_SESS_CLINIC_NAME: NORMAL
MDC_IDC_SESS_DTM: NORMAL
MDC_IDC_SESS_TYPE: NORMAL
MDC_IDC_SET_BRADY_AT_MODE_SWITCH_MODE: NORMAL
MDC_IDC_SET_BRADY_AT_MODE_SWITCH_RATE: 175 {BEATS}/MIN
MDC_IDC_SET_BRADY_LOWRATE: 60 {BEATS}/MIN
MDC_IDC_SET_BRADY_MAX_SENSOR_RATE: 130 {BEATS}/MIN
MDC_IDC_SET_BRADY_MAX_TRACKING_RATE: 130 {BEATS}/MIN
MDC_IDC_SET_BRADY_MODE: NORMAL
MDC_IDC_SET_BRADY_PAV_DELAY_LOW: 150 MS
MDC_IDC_SET_BRADY_SAV_DELAY_LOW: 120 MS
MDC_IDC_SET_LEADCHNL_RA_PACING_AMPLITUDE: 1.5 V
MDC_IDC_SET_LEADCHNL_RA_PACING_ANODE_ELECTRODE_1: NORMAL
MDC_IDC_SET_LEADCHNL_RA_PACING_ANODE_LOCATION_1: NORMAL
MDC_IDC_SET_LEADCHNL_RA_PACING_CAPTURE_MODE: NORMAL
MDC_IDC_SET_LEADCHNL_RA_PACING_CATHODE_ELECTRODE_1: NORMAL
MDC_IDC_SET_LEADCHNL_RA_PACING_CATHODE_LOCATION_1: NORMAL
MDC_IDC_SET_LEADCHNL_RA_PACING_POLARITY: NORMAL
MDC_IDC_SET_LEADCHNL_RA_PACING_PULSEWIDTH: 0.4 MS
MDC_IDC_SET_LEADCHNL_RA_SENSING_ANODE_ELECTRODE_1: NORMAL
MDC_IDC_SET_LEADCHNL_RA_SENSING_ANODE_LOCATION_1: NORMAL
MDC_IDC_SET_LEADCHNL_RA_SENSING_CATHODE_ELECTRODE_1: NORMAL
MDC_IDC_SET_LEADCHNL_RA_SENSING_CATHODE_LOCATION_1: NORMAL
MDC_IDC_SET_LEADCHNL_RA_SENSING_POLARITY: NORMAL
MDC_IDC_SET_LEADCHNL_RA_SENSING_SENSITIVITY: 0.5 MV
MDC_IDC_SET_LEADCHNL_RV_PACING_AMPLITUDE: 2 V
MDC_IDC_SET_LEADCHNL_RV_PACING_ANODE_ELECTRODE_1: NORMAL
MDC_IDC_SET_LEADCHNL_RV_PACING_ANODE_LOCATION_1: NORMAL
MDC_IDC_SET_LEADCHNL_RV_PACING_CAPTURE_MODE: NORMAL
MDC_IDC_SET_LEADCHNL_RV_PACING_CATHODE_ELECTRODE_1: NORMAL
MDC_IDC_SET_LEADCHNL_RV_PACING_CATHODE_LOCATION_1: NORMAL
MDC_IDC_SET_LEADCHNL_RV_PACING_POLARITY: NORMAL
MDC_IDC_SET_LEADCHNL_RV_PACING_PULSEWIDTH: 0.4 MS
MDC_IDC_SET_LEADCHNL_RV_SENSING_ANODE_ELECTRODE_1: NORMAL
MDC_IDC_SET_LEADCHNL_RV_SENSING_ANODE_LOCATION_1: NORMAL
MDC_IDC_SET_LEADCHNL_RV_SENSING_CATHODE_ELECTRODE_1: NORMAL
MDC_IDC_SET_LEADCHNL_RV_SENSING_CATHODE_LOCATION_1: NORMAL
MDC_IDC_SET_LEADCHNL_RV_SENSING_POLARITY: NORMAL
MDC_IDC_SET_LEADCHNL_RV_SENSING_SENSITIVITY: 2 MV
MDC_IDC_SET_ZONE_DETECTION_INTERVAL: 333.33 MS
MDC_IDC_SET_ZONE_DETECTION_INTERVAL: 342.86 MS
MDC_IDC_SET_ZONE_TYPE: NORMAL
MDC_IDC_SET_ZONE_TYPE: NORMAL
MDC_IDC_STAT_AT_BURDEN_PERCENT: 0 %
MDC_IDC_STAT_AT_DTM_END: NORMAL
MDC_IDC_STAT_AT_DTM_START: NORMAL
MDC_IDC_STAT_AT_MODE_SW_COUNT: 2
MDC_IDC_STAT_BRADY_AP_VP_PERCENT: 0 %
MDC_IDC_STAT_BRADY_AP_VS_PERCENT: 90 %
MDC_IDC_STAT_BRADY_AS_VP_PERCENT: 0 %
MDC_IDC_STAT_BRADY_AS_VS_PERCENT: 10 %
MDC_IDC_STAT_BRADY_DTM_END: NORMAL
MDC_IDC_STAT_BRADY_DTM_START: NORMAL
MDC_IDC_STAT_EPISODE_RECENT_COUNT: 0
MDC_IDC_STAT_EPISODE_RECENT_COUNT: 0
MDC_IDC_STAT_EPISODE_RECENT_COUNT_DTM_END: NORMAL
MDC_IDC_STAT_EPISODE_RECENT_COUNT_DTM_END: NORMAL
MDC_IDC_STAT_EPISODE_RECENT_COUNT_DTM_START: NORMAL
MDC_IDC_STAT_EPISODE_RECENT_COUNT_DTM_START: NORMAL
MDC_IDC_STAT_EPISODE_TYPE: NORMAL
MDC_IDC_STAT_EPISODE_TYPE: NORMAL

## 2019-03-19 DIAGNOSIS — E78.5 HYPERLIPIDEMIA LDL GOAL <100: ICD-10-CM

## 2019-03-19 DIAGNOSIS — I25.10 CORONARY ARTERY DISEASE INVOLVING NATIVE CORONARY ARTERY OF NATIVE HEART WITHOUT ANGINA PECTORIS: ICD-10-CM

## 2019-03-19 DIAGNOSIS — I10 ESSENTIAL HYPERTENSION, BENIGN: ICD-10-CM

## 2019-03-19 LAB
ALBUMIN SERPL-MCNC: 3.6 G/DL (ref 3.4–5)
ALP SERPL-CCNC: 65 U/L (ref 40–150)
ALT SERPL W P-5'-P-CCNC: 16 U/L (ref 0–50)
ANION GAP SERPL CALCULATED.3IONS-SCNC: 5 MMOL/L (ref 3–14)
AST SERPL W P-5'-P-CCNC: 19 U/L (ref 0–45)
BILIRUB SERPL-MCNC: 0.5 MG/DL (ref 0.2–1.3)
BUN SERPL-MCNC: 21 MG/DL (ref 7–30)
CALCIUM SERPL-MCNC: 8.8 MG/DL (ref 8.5–10.1)
CHLORIDE SERPL-SCNC: 109 MMOL/L (ref 94–109)
CHOLEST SERPL-MCNC: 168 MG/DL
CO2 SERPL-SCNC: 26 MMOL/L (ref 20–32)
CREAT SERPL-MCNC: 1.1 MG/DL (ref 0.52–1.04)
ERYTHROCYTE [DISTWIDTH] IN BLOOD BY AUTOMATED COUNT: 14.8 % (ref 10–15)
GFR SERPL CREATININE-BSD FRML MDRD: 47 ML/MIN/{1.73_M2}
GLUCOSE SERPL-MCNC: 119 MG/DL (ref 70–99)
HCT VFR BLD AUTO: 43.6 % (ref 35–47)
HDLC SERPL-MCNC: 49 MG/DL
HGB BLD-MCNC: 13.9 G/DL (ref 11.7–15.7)
LDLC SERPL CALC-MCNC: 84 MG/DL
MCH RBC QN AUTO: 29.6 PG (ref 26.5–33)
MCHC RBC AUTO-ENTMCNC: 31.9 G/DL (ref 31.5–36.5)
MCV RBC AUTO: 93 FL (ref 78–100)
NONHDLC SERPL-MCNC: 119 MG/DL
PLATELET # BLD AUTO: 196 10E9/L (ref 150–450)
POTASSIUM SERPL-SCNC: 4.4 MMOL/L (ref 3.4–5.3)
PROT SERPL-MCNC: 7.3 G/DL (ref 6.8–8.8)
RBC # BLD AUTO: 4.69 10E12/L (ref 3.8–5.2)
SODIUM SERPL-SCNC: 140 MMOL/L (ref 133–144)
TRIGL SERPL-MCNC: 173 MG/DL
WBC # BLD AUTO: 9 10E9/L (ref 4–11)

## 2019-03-19 PROCEDURE — 80061 LIPID PANEL: CPT | Performed by: INTERNAL MEDICINE

## 2019-03-19 PROCEDURE — 85027 COMPLETE CBC AUTOMATED: CPT | Performed by: INTERNAL MEDICINE

## 2019-03-19 PROCEDURE — 80053 COMPREHEN METABOLIC PANEL: CPT | Performed by: INTERNAL MEDICINE

## 2019-03-19 PROCEDURE — 36415 COLL VENOUS BLD VENIPUNCTURE: CPT | Performed by: INTERNAL MEDICINE

## 2019-12-23 DIAGNOSIS — I25.10 CORONARY ARTERY DISEASE INVOLVING NATIVE CORONARY ARTERY OF NATIVE HEART WITHOUT ANGINA PECTORIS: ICD-10-CM

## 2019-12-23 DIAGNOSIS — E78.5 HYPERLIPIDEMIA LDL GOAL <100: ICD-10-CM

## 2019-12-23 RX ORDER — PRAVASTATIN SODIUM 80 MG/1
TABLET ORAL
Qty: 30 TABLET | Refills: 0 | Status: SHIPPED | OUTPATIENT
Start: 2019-12-23 | End: 2020-01-24

## 2019-12-23 NOTE — TELEPHONE ENCOUNTER
"Requested Prescriptions   Pending Prescriptions Disp Refills     pravastatin (PRAVACHOL) 80 MG tablet [Pharmacy Med Name: PRAVASTATIN SODIUM 80MG TABS] 90 tablet 3     Sig: TAKE ONE TABLET BY MOUTH ONCE DAILY       Statins Protocol Failed - 12/23/2019  9:31 AM        Failed - Recent (12 mo) or future (30 days) visit within the authorizing provider's specialty     Patient has had an office visit with the authorizing provider or a provider within the authorizing providers department within the previous 12 mos or has a future within next 30 days. See \"Patient Info\" tab in inbasket, or \"Choose Columns\" in Meds & Orders section of the refill encounter.              Passed - LDL on file in past 12 months     Recent Labs   Lab Test 03/19/19  1016   LDL 84             Passed - No abnormal creatine kinase in past 12 months     No lab results found.             Passed - Medication is active on med list        Passed - Patient is age 18 or older        Passed - No active pregnancy on record        Passed - No positive pregnancy test in past 12 months        Medication is being filled for 1 time refill only due to:  Patient needs to be seen because it has been more than one year since last visit.    "

## 2019-12-23 NOTE — LETTER
[facility]  [address] [id number] [phone]            Shadia Gil  [address]   [place] [address]        December 23, 2019    Dear Shadia,    While refilling your prescription today, we noticed that you are due for an appointment with your provider.  We will refill your prescription for 30 days, but a follow-up appointment must be made before any additional refills can be approved.     Taking care of your health is important to us and we look forward to seeing you in the near future.  Please call us at [phone] or [phone] (or use [employer]) to schedule an appointment.     Please disregard this notice if you have already made an appointment.    Sincerely,        [facility]

## 2019-12-31 DIAGNOSIS — I25.10 CORONARY ARTERY DISEASE INVOLVING NATIVE CORONARY ARTERY OF NATIVE HEART WITHOUT ANGINA PECTORIS: ICD-10-CM

## 2019-12-31 DIAGNOSIS — I10 ESSENTIAL HYPERTENSION, BENIGN: ICD-10-CM

## 2019-12-31 NOTE — TELEPHONE ENCOUNTER
"Requested Prescriptions   Pending Prescriptions Disp Refills     metoprolol tartrate (LOPRESSOR) 50 MG tablet [Pharmacy Med Name: METOPROLOL TARTRATE 50MG TABS] 180 tablet 3     Sig: TAKE ONE TABLET BY MOUTH TWICE A DAY  Last Written Prescription Date:  12/19/18  Last Fill Quantity: 180,  # refills: 3   Last office visit: 12/19/2018 with prescribing provider:  12/19/18   Future Office Visit:  0       Beta-Blockers Protocol Failed - 12/31/2019  8:39 AM        Failed - Blood pressure under 140/90 in past 12 months     BP Readings from Last 3 Encounters:   12/19/18 136/70   08/10/18 128/72   05/24/18 140/70                 Failed - Recent (12 mo) or future (30 days) visit within the authorizing provider's specialty     Patient has had an office visit with the authorizing provider or a provider within the authorizing providers department within the previous 12 mos or has a future within next 30 days. See \"Patient Info\" tab in inbasket, or \"Choose Columns\" in Meds & Orders section of the refill encounter.              Passed - Patient is age 6 or older        Passed - Medication is active on med list        "

## 2020-01-01 DIAGNOSIS — I10 ESSENTIAL HYPERTENSION, BENIGN: ICD-10-CM

## 2020-01-01 DIAGNOSIS — I25.10 CORONARY ARTERY DISEASE INVOLVING NATIVE CORONARY ARTERY OF NATIVE HEART WITHOUT ANGINA PECTORIS: ICD-10-CM

## 2020-01-01 DIAGNOSIS — E78.5 HYPERLIPIDEMIA LDL GOAL <100: ICD-10-CM

## 2020-01-01 RX ORDER — METOPROLOL TARTRATE 50 MG
TABLET ORAL
Qty: 60 TABLET | Refills: 0 | Status: SHIPPED | OUTPATIENT
Start: 2020-01-01 | End: 2020-01-24

## 2020-01-02 RX ORDER — LISINOPRIL 10 MG/1
TABLET ORAL
Qty: 90 TABLET | Refills: 0 | Status: SHIPPED | OUTPATIENT
Start: 2020-01-02 | End: 2020-01-24

## 2020-01-02 NOTE — TELEPHONE ENCOUNTER
"1 month prescription sent electronically to the specified pharmacy.  Return to see me in approximately 1 month, sooner if needed.  Please get fasting labs done in the [facility] lab 1-2 days before this appointment (make a  \"lab appointment\").  Eat nothing for at least 8 hours prior to having these labs drawn.  Use [employer] or Call [phone] to schedule the appointment with me and lab appointment.   "

## 2020-01-02 NOTE — TELEPHONE ENCOUNTER
"Requested Prescriptions   Pending Prescriptions Disp Refills     lisinopril (PRINIVIL/ZESTRIL) 10 MG tablet [Pharmacy Med Name: LISINOPRIL 10MG TABS] 270 tablet 3     Sig: TAKE TWO TABLETS BY MOUTH EVERY MORNING AND TAKE ONE TABLET EVERY EVENING   Last Written Prescription Date:  12/19/2018  Last Fill Quantity: 270,  # refills: 3   Last Office Visit: 12/19/2018   Future Office Visit:         ACE Inhibitors (Including Combos) Protocol Failed - 1/1/2020 12:00 PM        Failed - Blood pressure under 140/90 in past 12 months     BP Readings from Last 3 Encounters:   12/19/18 136/70   08/10/18 128/72   05/24/18 140/70                 Failed - Recent (12 mo) or future (30 days) visit within the authorizing provider's specialty     Patient has had an office visit with the authorizing provider or a provider within the authorizing providers department within the previous 12 mos or has a future within next 30 days. See \"Patient Info\" tab in inbasket, or \"Choose Columns\" in Meds & Orders section of the refill encounter.              Failed - Normal serum creatinine on file in past 12 months     Recent Labs   Lab Test 03/19/19  1016   CR 1.10*             Passed - Medication is active on med list        Passed - Patient is age 18 or older        Passed - No active pregnancy on record        Passed - Normal serum potassium on file in past 12 months     Recent Labs   Lab Test 03/19/19  1016   POTASSIUM 4.4             Passed - No positive pregnancy test within past 12 months          "

## 2020-01-03 DIAGNOSIS — I10 ESSENTIAL HYPERTENSION, BENIGN: ICD-10-CM

## 2020-01-03 RX ORDER — LISINOPRIL 10 MG/1
TABLET ORAL
Qty: 270 TABLET | Refills: 3 | OUTPATIENT
Start: 2020-01-03

## 2020-01-03 NOTE — TELEPHONE ENCOUNTER
"Requested Prescriptions   Pending Prescriptions Disp Refills     lisinopril (PRINIVIL/ZESTRIL) 10 MG tablet [Pharmacy Med Name: LISINOPRIL 10MG TABS] 270 tablet 3     Sig: TAKE TWO TABLETS BY MOUTH EVERY MORNING AND TAKE ONE TABLET EVERY EVENING   Last Written Prescription Date:  1/2/2020  Last Fill Quantity: 90,  # refills: 0   Last Office Visit: 12/19/2018   Future Office Visit:         ACE Inhibitors (Including Combos) Protocol Failed - 1/3/2020  7:57 AM        Failed - Blood pressure under 140/90 in past 12 months     BP Readings from Last 3 Encounters:   12/19/18 136/70   08/10/18 128/72   05/24/18 140/70                 Failed - Recent (12 mo) or future (30 days) visit within the authorizing provider's specialty     Patient has had an office visit with the authorizing provider or a provider within the authorizing providers department within the previous 12 mos or has a future within next 30 days. See \"Patient Info\" tab in inbasket, or \"Choose Columns\" in Meds & Orders section of the refill encounter.              Failed - Normal serum creatinine on file in past 12 months     Recent Labs   Lab Test 03/19/19  1016   CR 1.10*             Passed - Medication is active on med list        Passed - Patient is age 18 or older        Passed - No active pregnancy on record        Passed - Normal serum potassium on file in past 12 months     Recent Labs   Lab Test 03/19/19  1016   POTASSIUM 4.4             Passed - No positive pregnancy test within past 12 months          "

## 2020-01-09 ENCOUNTER — DOCUMENTATION ONLY (OUTPATIENT)
Dept: CARDIOLOGY | Facility: CLINIC | Age: 80
End: 2020-01-09

## 2020-01-09 NOTE — TELEPHONE ENCOUNTER
Pt loss to follow up on her pacemaker. Tried to contact pt via phone with no response. Mailed 1 week letter. Ashlyn OVIEDO

## 2020-01-16 ENCOUNTER — ANCILLARY PROCEDURE (OUTPATIENT)
Dept: CARDIOLOGY | Facility: CLINIC | Age: 80
End: 2020-01-16
Attending: INTERNAL MEDICINE
Payer: COMMERCIAL

## 2020-01-16 DIAGNOSIS — Z95.0 CARDIAC PACEMAKER IN SITU: ICD-10-CM

## 2020-01-16 DIAGNOSIS — I49.5 SICK SINUS SYNDROME (H): Primary | ICD-10-CM

## 2020-01-16 DIAGNOSIS — Z95.0 CARDIAC PACEMAKER IN SITU: Primary | ICD-10-CM

## 2020-01-16 PROCEDURE — 93296 REM INTERROG EVL PM/IDS: CPT | Performed by: INTERNAL MEDICINE

## 2020-01-16 PROCEDURE — 93294 REM INTERROG EVL PM/LDLS PM: CPT | Performed by: INTERNAL MEDICINE

## 2020-01-23 LAB
MDC_IDC_LEAD_IMPLANT_DT: NORMAL
MDC_IDC_LEAD_IMPLANT_DT: NORMAL
MDC_IDC_LEAD_LOCATION: NORMAL
MDC_IDC_LEAD_LOCATION: NORMAL
MDC_IDC_LEAD_LOCATION_DETAIL_1: NORMAL
MDC_IDC_LEAD_LOCATION_DETAIL_1: NORMAL
MDC_IDC_LEAD_MFG: NORMAL
MDC_IDC_LEAD_MFG: NORMAL
MDC_IDC_LEAD_MODEL: NORMAL
MDC_IDC_LEAD_MODEL: NORMAL
MDC_IDC_LEAD_POLARITY_TYPE: NORMAL
MDC_IDC_LEAD_POLARITY_TYPE: NORMAL
MDC_IDC_LEAD_SERIAL: NORMAL
MDC_IDC_LEAD_SERIAL: NORMAL
MDC_IDC_MSMT_BATTERY_DTM: NORMAL
MDC_IDC_MSMT_BATTERY_IMPEDANCE: 326 OHM
MDC_IDC_MSMT_BATTERY_REMAINING_LONGEVITY: 117 MO
MDC_IDC_MSMT_BATTERY_STATUS: NORMAL
MDC_IDC_MSMT_BATTERY_VOLTAGE: 2.79 V
MDC_IDC_MSMT_LEADCHNL_RA_IMPEDANCE_VALUE: 393 OHM
MDC_IDC_MSMT_LEADCHNL_RA_PACING_THRESHOLD_AMPLITUDE: 0.62 V
MDC_IDC_MSMT_LEADCHNL_RA_PACING_THRESHOLD_PULSEWIDTH: 0.4 MS
MDC_IDC_MSMT_LEADCHNL_RV_IMPEDANCE_VALUE: 374 OHM
MDC_IDC_MSMT_LEADCHNL_RV_PACING_THRESHOLD_AMPLITUDE: 0.5 V
MDC_IDC_MSMT_LEADCHNL_RV_PACING_THRESHOLD_PULSEWIDTH: 0.4 MS
MDC_IDC_PG_IMPLANT_DTM: NORMAL
MDC_IDC_PG_MFG: NORMAL
MDC_IDC_PG_MODEL: NORMAL
MDC_IDC_PG_SERIAL: NORMAL
MDC_IDC_PG_TYPE: NORMAL
MDC_IDC_SESS_CLINIC_NAME: NORMAL
MDC_IDC_SESS_DTM: NORMAL
MDC_IDC_SESS_TYPE: NORMAL
MDC_IDC_SET_BRADY_AT_MODE_SWITCH_MODE: NORMAL
MDC_IDC_SET_BRADY_AT_MODE_SWITCH_RATE: 175 {BEATS}/MIN
MDC_IDC_SET_BRADY_LOWRATE: 60 {BEATS}/MIN
MDC_IDC_SET_BRADY_MAX_SENSOR_RATE: 130 {BEATS}/MIN
MDC_IDC_SET_BRADY_MAX_TRACKING_RATE: 130 {BEATS}/MIN
MDC_IDC_SET_BRADY_MODE: NORMAL
MDC_IDC_SET_BRADY_PAV_DELAY_LOW: 150 MS
MDC_IDC_SET_BRADY_SAV_DELAY_LOW: 120 MS
MDC_IDC_SET_LEADCHNL_RA_PACING_AMPLITUDE: 1.5 V
MDC_IDC_SET_LEADCHNL_RA_PACING_ANODE_ELECTRODE_1: NORMAL
MDC_IDC_SET_LEADCHNL_RA_PACING_ANODE_LOCATION_1: NORMAL
MDC_IDC_SET_LEADCHNL_RA_PACING_CAPTURE_MODE: NORMAL
MDC_IDC_SET_LEADCHNL_RA_PACING_CATHODE_ELECTRODE_1: NORMAL
MDC_IDC_SET_LEADCHNL_RA_PACING_CATHODE_LOCATION_1: NORMAL
MDC_IDC_SET_LEADCHNL_RA_PACING_POLARITY: NORMAL
MDC_IDC_SET_LEADCHNL_RA_PACING_PULSEWIDTH: 0.4 MS
MDC_IDC_SET_LEADCHNL_RA_SENSING_ANODE_ELECTRODE_1: NORMAL
MDC_IDC_SET_LEADCHNL_RA_SENSING_ANODE_LOCATION_1: NORMAL
MDC_IDC_SET_LEADCHNL_RA_SENSING_CATHODE_ELECTRODE_1: NORMAL
MDC_IDC_SET_LEADCHNL_RA_SENSING_CATHODE_LOCATION_1: NORMAL
MDC_IDC_SET_LEADCHNL_RA_SENSING_POLARITY: NORMAL
MDC_IDC_SET_LEADCHNL_RA_SENSING_SENSITIVITY: 0.5 MV
MDC_IDC_SET_LEADCHNL_RV_PACING_AMPLITUDE: 2 V
MDC_IDC_SET_LEADCHNL_RV_PACING_ANODE_ELECTRODE_1: NORMAL
MDC_IDC_SET_LEADCHNL_RV_PACING_ANODE_LOCATION_1: NORMAL
MDC_IDC_SET_LEADCHNL_RV_PACING_CAPTURE_MODE: NORMAL
MDC_IDC_SET_LEADCHNL_RV_PACING_CATHODE_ELECTRODE_1: NORMAL
MDC_IDC_SET_LEADCHNL_RV_PACING_CATHODE_LOCATION_1: NORMAL
MDC_IDC_SET_LEADCHNL_RV_PACING_POLARITY: NORMAL
MDC_IDC_SET_LEADCHNL_RV_PACING_PULSEWIDTH: 0.4 MS
MDC_IDC_SET_LEADCHNL_RV_SENSING_ANODE_ELECTRODE_1: NORMAL
MDC_IDC_SET_LEADCHNL_RV_SENSING_ANODE_LOCATION_1: NORMAL
MDC_IDC_SET_LEADCHNL_RV_SENSING_CATHODE_ELECTRODE_1: NORMAL
MDC_IDC_SET_LEADCHNL_RV_SENSING_CATHODE_LOCATION_1: NORMAL
MDC_IDC_SET_LEADCHNL_RV_SENSING_POLARITY: NORMAL
MDC_IDC_SET_LEADCHNL_RV_SENSING_SENSITIVITY: 2 MV
MDC_IDC_SET_ZONE_DETECTION_INTERVAL: 333.33 MS
MDC_IDC_SET_ZONE_DETECTION_INTERVAL: 342.86 MS
MDC_IDC_SET_ZONE_TYPE: NORMAL
MDC_IDC_SET_ZONE_TYPE: NORMAL
MDC_IDC_STAT_AT_BURDEN_PERCENT: 0 %
MDC_IDC_STAT_AT_DTM_END: NORMAL
MDC_IDC_STAT_AT_DTM_START: NORMAL
MDC_IDC_STAT_AT_MODE_SW_COUNT: 10
MDC_IDC_STAT_BRADY_AP_VP_PERCENT: 0 %
MDC_IDC_STAT_BRADY_AP_VS_PERCENT: 90 %
MDC_IDC_STAT_BRADY_AS_VP_PERCENT: 0 %
MDC_IDC_STAT_BRADY_AS_VS_PERCENT: 10 %
MDC_IDC_STAT_BRADY_DTM_END: NORMAL
MDC_IDC_STAT_BRADY_DTM_START: NORMAL
MDC_IDC_STAT_EPISODE_RECENT_COUNT: 0
MDC_IDC_STAT_EPISODE_RECENT_COUNT: 0
MDC_IDC_STAT_EPISODE_RECENT_COUNT_DTM_END: NORMAL
MDC_IDC_STAT_EPISODE_RECENT_COUNT_DTM_END: NORMAL
MDC_IDC_STAT_EPISODE_RECENT_COUNT_DTM_START: NORMAL
MDC_IDC_STAT_EPISODE_RECENT_COUNT_DTM_START: NORMAL
MDC_IDC_STAT_EPISODE_TYPE: NORMAL
MDC_IDC_STAT_EPISODE_TYPE: NORMAL

## 2020-01-24 ENCOUNTER — OFFICE VISIT (OUTPATIENT)
Dept: INTERNAL MEDICINE | Facility: CLINIC | Age: 80
End: 2020-01-24
Payer: COMMERCIAL

## 2020-01-24 VITALS
HEIGHT: 64 IN | DIASTOLIC BLOOD PRESSURE: 84 MMHG | WEIGHT: 224.3 LBS | BODY MASS INDEX: 38.29 KG/M2 | HEART RATE: 60 BPM | OXYGEN SATURATION: 96 % | SYSTOLIC BLOOD PRESSURE: 136 MMHG | TEMPERATURE: 97.5 F

## 2020-01-24 DIAGNOSIS — I10 ESSENTIAL HYPERTENSION, BENIGN: ICD-10-CM

## 2020-01-24 DIAGNOSIS — I25.10 CORONARY ARTERY DISEASE INVOLVING NATIVE CORONARY ARTERY OF NATIVE HEART WITHOUT ANGINA PECTORIS: ICD-10-CM

## 2020-01-24 DIAGNOSIS — N18.30 CKD (CHRONIC KIDNEY DISEASE) STAGE 3, GFR 30-59 ML/MIN (H): ICD-10-CM

## 2020-01-24 DIAGNOSIS — E78.5 HYPERLIPIDEMIA LDL GOAL <100: ICD-10-CM

## 2020-01-24 LAB
ALBUMIN SERPL-MCNC: 3.8 G/DL (ref 3.4–5)
ALP SERPL-CCNC: 70 U/L (ref 40–150)
ALT SERPL W P-5'-P-CCNC: 16 U/L (ref 0–50)
ANION GAP SERPL CALCULATED.3IONS-SCNC: 4 MMOL/L (ref 3–14)
AST SERPL W P-5'-P-CCNC: 16 U/L (ref 0–45)
BILIRUB SERPL-MCNC: 0.5 MG/DL (ref 0.2–1.3)
BUN SERPL-MCNC: 23 MG/DL (ref 7–30)
CALCIUM SERPL-MCNC: 9.6 MG/DL (ref 8.5–10.1)
CHLORIDE SERPL-SCNC: 108 MMOL/L (ref 94–109)
CHOLEST SERPL-MCNC: 182 MG/DL
CO2 SERPL-SCNC: 28 MMOL/L (ref 20–32)
CREAT SERPL-MCNC: 1.07 MG/DL (ref 0.52–1.04)
ERYTHROCYTE [DISTWIDTH] IN BLOOD BY AUTOMATED COUNT: 14.9 % (ref 10–15)
GFR SERPL CREATININE-BSD FRML MDRD: 48 ML/MIN/{1.73_M2}
GLUCOSE SERPL-MCNC: 78 MG/DL (ref 70–99)
HCT VFR BLD AUTO: 45.7 % (ref 35–47)
HDLC SERPL-MCNC: 53 MG/DL
HGB BLD-MCNC: 14.6 G/DL (ref 11.7–15.7)
LDLC SERPL CALC-MCNC: 96 MG/DL
MCH RBC QN AUTO: 29.2 PG (ref 26.5–33)
MCHC RBC AUTO-ENTMCNC: 31.9 G/DL (ref 31.5–36.5)
MCV RBC AUTO: 91 FL (ref 78–100)
NONHDLC SERPL-MCNC: 129 MG/DL
PLATELET # BLD AUTO: 212 10E9/L (ref 150–450)
POTASSIUM SERPL-SCNC: 4.3 MMOL/L (ref 3.4–5.3)
PROT SERPL-MCNC: 7.9 G/DL (ref 6.8–8.8)
RBC # BLD AUTO: 5 10E12/L (ref 3.8–5.2)
SODIUM SERPL-SCNC: 140 MMOL/L (ref 133–144)
TRIGL SERPL-MCNC: 164 MG/DL
WBC # BLD AUTO: 10.1 10E9/L (ref 4–11)

## 2020-01-24 PROCEDURE — 36415 COLL VENOUS BLD VENIPUNCTURE: CPT | Performed by: INTERNAL MEDICINE

## 2020-01-24 PROCEDURE — 80061 LIPID PANEL: CPT | Performed by: INTERNAL MEDICINE

## 2020-01-24 PROCEDURE — 80053 COMPREHEN METABOLIC PANEL: CPT | Performed by: INTERNAL MEDICINE

## 2020-01-24 PROCEDURE — 85027 COMPLETE CBC AUTOMATED: CPT | Performed by: INTERNAL MEDICINE

## 2020-01-24 PROCEDURE — 99214 OFFICE O/P EST MOD 30 MIN: CPT | Performed by: INTERNAL MEDICINE

## 2020-01-24 RX ORDER — PRAVASTATIN SODIUM 80 MG/1
80 TABLET ORAL DAILY
Qty: 30 TABLET | Refills: 11 | Status: SHIPPED | OUTPATIENT
Start: 2020-01-24 | End: 2021-01-25

## 2020-01-24 RX ORDER — LISINOPRIL 10 MG/1
TABLET ORAL
Qty: 90 TABLET | Refills: 11 | Status: SHIPPED | OUTPATIENT
Start: 2020-01-24 | End: 2021-01-08

## 2020-01-24 RX ORDER — METOPROLOL TARTRATE 50 MG
50 TABLET ORAL 2 TIMES DAILY
Qty: 60 TABLET | Refills: 11 | Status: SHIPPED | OUTPATIENT
Start: 2020-01-24 | End: 2021-01-08

## 2020-01-24 ASSESSMENT — MIFFLIN-ST. JEOR: SCORE: 1454.48

## 2020-01-24 NOTE — PROGRESS NOTES
Subjective     Shadia Gil is a 80 year old female who presents to clinic today for the following health issues:    HPI   Hyperlipidemia Follow-Up      Are you regularly taking any medication or supplement to lower your cholesterol?   Yes- Pravastatin 80mg    Are you having muscle aches or other side effects that you think could be caused by your cholesterol lowering medication?  No    Hypertension Follow-up      Do you check your blood pressure regularly outside of the clinic? No     Are you following a low salt diet? No    Are your blood pressures ever more than 140 on the top number (systolic) OR more   than 90 on the bottom number (diastolic), for example 140/90? n/a      How many servings of fruits and vegetables do you eat daily?  2-3    On average, how many sweetened beverages do you drink each day (Examples: soda, juice, sweet tea, etc.  Do NOT count diet or artificially sweetened beverages)?   1    How many days per week do you miss taking your medication? 0      3.  Prior of coronary artery disease as listed in medical history.  No current or recent cardiovascaulr symptoms.   No shortness of breath, no episodes of chest pain/pressure, no dyspnea on exertion, no changes in his abiliy to perform physical exertion or tasks.  Takes the same amount of time to perform similar physical tasks.       4.  History of chronic kidney disease presumed due to HTN.  Reviewed most recent labs:    Lab Results   Component Value Date    CR 1.07 01/24/2020    CR 1.10 03/19/2019    CR 1.18 01/26/2018    CR 1.13 11/22/2017    CR 1.14 09/21/2016    CR 1.12 03/31/2016    CR 1.24 03/31/2016    CR 1.13 02/03/2016    CR 1.07 02/02/2016    CR 1.00 02/01/2016    CR 1.06 02/01/2016    CR 1.19 09/30/2015    CR 1.09 01/29/2015    CR 1.19 09/16/2014    CR 1.23 01/28/2013    CR 1.12 01/21/2013    CR 1.29 11/23/2012    CR 1.34 11/22/2012    CR 1.47 11/21/2012    CR 1.62 11/16/2012    CR 1.20 08/29/2012    CR 1.1 07/28/2011    CR  "1.1 07/28/2011    CR 0.93 02/05/2011    CR 1.02 02/15/2010    CR Unsatisfactory specimen - hemolyzed 02/15/2010    CR 1.05 02/01/2010    CR 1.17 01/31/2010    CR 0.86 01/30/2010    CR 0.92 01/28/2010    CR 0.97 01/28/2010               Reviewed and updated as needed this visit by Provider  Tobacco         Review of Systems         Objective    /84   Pulse 60   Temp 97.5  F (36.4  C) (Temporal)   Ht 1.613 m (5' 3.5\")   Wt 101.7 kg (224 lb 4.8 oz)   SpO2 96%   BMI 39.11 kg/m    Body mass index is 39.11 kg/m .  Physical Exam                 Past Medical History:  ---------------------------  Past Medical History:   Diagnosis Date     CAD (coronary artery disease) 1/29/10    CABG x 4; LIMA to the LAD and vein graft to the OM, OM2 and RCA      Essential hypertension, benign      GERD (gastroesophageal reflux disease)      Hyperlipidemia LDL goal <100 7/11/11         Obesity (BMI 30-39.9) 2015    BMI 36.8     Vitamin D deficiency        Past Surgical History:  ---------------------------  Past Surgical History:   Procedure Laterality Date     CORONARY ARTERY BYPASS  1/29/10    4 vessel CABG at [facility]; Four-vessel coronary artery bypass, beating heart technique.  Endoscopic vein harvest using the saphenous vein for the distal right, saphenous vein for the first and second marginal branches of the circumflex, left internal mammary artery for the LAD     IMPLANT PACEMAKER  11/2012    Dual Chamber     LAPAROSCOPIC CHOLECYSTECTOMY N/A 2/2/2016    Procedure: LAPAROSCOPIC CHOLECYSTECTOMY;  Surgeon: Maurilio Burger MD;  Location: [facility] OR       Current Medications:  ---------------------------  Current Outpatient Medications   Medication Sig Dispense Refill     aspirin 81 MG tablet Take 1 tablet by mouth daily.       cholecalciferol (VITAMIN D3) 1000 UNIT tablet Take 1 tablet (1,000 Units) by mouth daily       Coenzyme Q10 (COQ-10) 200 MG CAPS Take 1 capsule by mouth daily Take at same time as statin " "cholesterol medications       lisinopril (PRINIVIL/ZESTRIL) 10 MG tablet Take 2 tablets (20 mg) by mouth every morning AND 1 tablet (10 mg) every evening. 90 tablet 11     metoprolol tartrate (LOPRESSOR) 50 MG tablet Take 1 tablet (50 mg) by mouth 2 times daily 60 tablet 11     multivitamin, therapeutic with minerals (MULTI-VITAMIN) TABS tablet Take 1 tablet by mouth daily       pravastatin (PRAVACHOL) 80 MG tablet Take 1 tablet (80 mg) by mouth daily 30 tablet 11     nitroglycerin (NITROSTAT) 0.4 MG SL tablet Place 1 tablet (0.4 mg) under the tongue every 5 minutes as needed for chest pain (Patient not taking: Reported on 8/10/2018) 25 tablet 0     triamcinolone (KENALOG) 0.1 % cream Apply sparingly to affected area three times daily for 14 days. 30 g 0       Allergies:  -------------  Allergies   Allergen Reactions     Atorvastatin Other (See Comments)     Myalgias, dizziness     Codeine      States \"there was only a question about it\"     Penicillins      States,\" there is a question about it\"       Social History:  -------------------  Social History     Socioeconomic History     Marital status:      Spouse name: Not on file     Number of children: Not on file     Years of education: Not on file     Highest education level: Not on file   Occupational History     Not on file   Social Needs     Financial resource strain: Not on file     Food insecurity:     Worry: Not on file     Inability: Not on file     Transportation needs:     Medical: Not on file     Non-medical: Not on file   Tobacco Use     Smoking status: Never Smoker     Smokeless tobacco: Never Used   Substance and Sexual Activity     Alcohol use: No     Alcohol/week: 0.0 standard drinks     Drug use: No     Sexual activity: Not Currently   Lifestyle     Physical activity:     Days per week: Not on file     Minutes per session: Not on file     Stress: Not on file   Relationships     Social connections:     Talks on phone: Not on file     Gets " "together: Not on file     Attends Anglican service: Not on file     Active member of club or organization: Not on file     Attends meetings of clubs or organizations: Not on file     Relationship status: Not on file     Intimate partner violence:     Fear of current or ex partner: Not on file     Emotionally abused: Not on file     Physically abused: Not on file     Forced sexual activity: Not on file   Other Topics Concern     Parent/sibling w/ CABG, MI or angioplasty before 65F 55M? Not Asked      Service Not Asked     Blood Transfusions Not Asked     Caffeine Concern Yes     Comment: tea occasional      Occupational Exposure Not Asked     Hobby Hazards Not Asked     Sleep Concern Not Asked     Stress Concern Not Asked     Weight Concern Not Asked     Special Diet Not Asked     Back Care Not Asked     Exercise No     Bike Helmet Not Asked     Seat Belt Not Asked     Self-Exams Not Asked   Social History Narrative     Not on file       Family Medical History:  ------------------------------  Family History   Problem Relation Age of Onset     Cancer Mother      Heart Disease Brother      C.A.D. Brother          ROS:  REVIEW OF SYSTEMS:    RESP: negative for cough, dyspnea, wheezing, hemoptysis  CV: negative for chest pain, palpitations, PND, CAVAZOS, orthopnea; reports no significant changes in their ability to perform physical activity (from cardiovascular standpoint)  GI: negative for dysphagia, N/V, pain, melena, diarrhea and constipation  NEURO: negative for new numbness/tingling, paralysis, incoordination, or focal weakness     OBJECTIVE:                                                    /84   Pulse 60   Temp 97.5  F (36.4  C) (Temporal)   Ht 1.613 m (5' 3.5\")   Wt 101.7 kg (224 lb 4.8 oz)   SpO2 96%   BMI 39.11 kg/m       GENERAL alert and no distress  EYES:  Normal sclera,conjunctiva, EOMI  HENT: oral and posterior pharynx without lesions or erythema, facies symmetric  NECK: Neck supple. No " LAD, without thyroidmegaly.  RESP: Clear to ausculation bilaterally without wheezes or crackles. Normal BS in all fields.  CV: RRR normal S1S2 without murmurs, rubs or gallops.  LYMPH: no cervical lymph adenopathy appreciated  MS: extremities- no gross deformities of the visible extremities noted,   EXT:  no lower extremity edema  PSYCH: Alert and oriented times 3; speech- coherent  SKIN:  No obvious significant skin lesions on visible portions of face          ASSESSMENT/PLAN:                                                      (I10) Essential hypertension, benign  Comment: This condition is currently controlled on the current medical regimen.  Continue current therapy.   Discussed current hypertension treatment guidelines, including indications for treatment and treatment options.  Discussed the importance for aggressive management of HTN to prevent vascular complications later.  Recommended lower fat, lower carbohydrate, and lower sodium (<2000 mg)diet.  Discussed required intervals for follow up on HTN, lab studies.  Recommened pt. follow their blood pressures outside the clinic to ensure that BPs are remaining within guidelines, and to contact me if the readings are not within guidelines on a regular basis so we can adjust treatment as needed.   Plan: lisinopril (PRINIVIL/ZESTRIL) 10 MG tablet,         metoprolol tartrate (LOPRESSOR) 50 MG tablet            (I25.10) Coronary artery disease involving native coronary artery of native heart without angina pectoris  Comment: This condition is currently controlled on the current medical regimen.  Continue current therapy.     Plan: metoprolol tartrate (LOPRESSOR) 50 MG tablet,         pravastatin (PRAVACHOL) 80 MG tablet            (E78.5) Hyperlipidemia LDL goal <100  Comment: Discussed guidelines recommending a statin cholesterol lowering medication for any patient with either diabetes and/or vascular disease, aiming for a LDL goal under 100 for sure, ideally  under 70.    Reviewed statins and their side effects including muscle pain, muscle inflammation, GI upset.  Told the patient to stop the medication in question and to call if any side effects develop.   Recommended CoQ10 200-300 mg at the same time as taking the statin medication to help reduce the chance of muscle side effects from the statin.    Plan: pravastatin (PRAVACHOL) 80 MG tablet            (N18.3) CKD (chronic kidney disease) stage 3, GFR 30-59 ml/min (H)  Comment: Chronic kidney disease due to HTN and DM.  Check urine for protein.   Patient is on ACE/ARB.  Patient is on a statin.  Told the patient to avoid NSAIDs if at all possible.   Will monitor closely and send to Nephrologist if renal function continues to decline.     Plan:       See Patient Instructions    ISADORA KELLEY M.D., MD  [facility]    (Chart documentation may have been completed, in part, with [employer] voice-recognition software. Even though reviewed, some grammatical, spelling, and word errors may remain.)

## 2020-01-24 NOTE — PATIENT INSTRUCTIONS
"*  Continue all medications at the same doses.  Contact your usual pharmacy if you need refills.     *  Follow up with the Cardiology Clinic this year.      *  Return to see me in 1 year, sooner if needed.  Use [employer] or Call [phone] to schedule the appointment with me.       SHINGLES VACCINE:        I would recommend that you consider getting a \"shingles vaccine\".  The shingles vaccine does not stop you from getting shingles, but it decreases the intensity of the event, the duration of the event, and decreases the painful nerve condition that results     There are two options available for shingles vaccines:     --Shingrix: 2 shots, give 2-6 months apart  **recommended**   OR   --Zostavax, one shot       Based on the available data, the Shingrix vaccine has superior benefit and should be considered even if you have had the Zostavax vaccine before.         Contact your insurance provider and ask them if either shingles vaccine is covered and is so, how much it will cost you.  Usually this will be cheaper to get in a pharmacy given by the pharmacist.       Regardless of the coverage, I would recommend that you consider the vaccine since shingles is very painful, (just ask anyone who has ever had it).      Preventive Health Recommendations  Female Ages 75+    Yearly exam: See your health care provider every year in order to  o Review health changes.   o Discuss preventive care.    o Review your medicines if your doctor has prescribed any.      Get a Pap test every three years (unless you have an abnormal result and your provider advises testing more often).    No more PAP smear needed.      You do not need a Pap test if your uterus was removed (hysterectomy) and you have not had cancer.    You should be tested each year for STDs (sexually transmitted diseases) if you're at risk.     Routine screening mammogram no longer indicated.    No routine screening colonoscopy indiacted     Have a cholesterol test every 5 " "years, or more often if advised.    Have a diabetes test (fasting glucose) every three years. If you are at risk for diabetes, you should have this test more often.     If you are at risk for osteoporosis (brittle bone disease), think about having a bone density scan (DEXA).    Shots:   *  Get a flu shot each year.   *  Get a tetanus shot every 10 years.    *  Pneumonia vaccine up to date.      Nutrition:     Eat at least 5 servings of fruits and vegetables each day.    Eat whole-grain bread, whole-wheat pasta and brown rice instead of white grains and rice.    Talk to your provider about Calcium and Vitamin D.    --Calcium: aim for 1200 mg per day (any brand is fine)   --Vitamin D3 at least 1000 units per day (any brand is fine)       --Good Grains:  Oats, brown rice, Quinoa (these do not raise the blood sugar as much)     --Bad grains:  Anything made from wheat or white rice     (because these raise the blood sugars significantly, and the possible gluten issue from wheat for some people).      --Proteins:  Aim for \"lean proteins\" including chicken, fish, seafood, pork, turkey, and eggs (in moderation); Eat red meat only occasionally        Lifestyle    Exercise at least 150 minutes a week (30 minutes a day, 5 days a week). This will help you control your weight and prevent disease.    Limit alcohol to one drink per day.    No smoking.     Wear sunscreen to prevent skin cancer.     See your dentist every six months for an exam and cleaning.    See your eye doctor every 1 to 2 years.        "

## 2020-01-24 NOTE — LETTER
1/28/2020      Shadiadonell Cabrera  [address]   [facility] [facility] [place] [address]      Dear Ms. Shadia Gil:    I am writing to inform you of the results of the laboratory tests you had done recently.    Total Cholesterol:   Lab Results   Component Value Date    CHOL 182 01/24/2020          (Recommended: below 200)  HDL (good) Cholesterol :   Lab Results   Component Value Date    HDL 53 01/24/2020         (Recommended: 40 or more)  LDL (bad) Cholesterol:    Lab Results   Component Value Date    LDL 96 01/24/2020          (Recommended: below 130, below 100 if heart disease or diabetes is diagnosed)  Triglycerides:    Lab Results   Component Value Date    TRIG 164 01/24/2020       (Recommended: below 180)  Non HDL cholesterol (Cholesterol ratio:   Lab Results   Component Value Date    CHOLHDLRATIO 2.9 09/30/2015    NHDL 129 01/24/2020     (Ideally below 130, Acceptable below 160).    Additional results of your recent labs are as noted.  Liver function: NORMAL  Kidney function: NORMAL  Hemoglobin: NORMAL  Electrolytes: NORMAL  Glucose: NORMAL    Your labs all looked good.  Continue all medications at the same doses.  Contact your usual pharmacy if you need refills.     Thank you for allowing me to participate in your care. If you have any further questions or problems, please contact me via our nurse line at [phone]    Sincerely,          Adryan Martinez M.D.  Department of Internal Medicine  [facility]

## 2020-01-24 NOTE — NURSING NOTE
"Chief Complaint   Patient presents with     Hyperlipidemia     Hypertension     /84   Pulse 60   Temp 97.5  F (36.4  C) (Temporal)   Ht 1.613 m (5' 3.5\")   Wt 101.7 kg (224 lb 4.8 oz)   SpO2 96%   BMI 39.11 kg/m   Estimated body mass index is 39.11 kg/m  as calculated from the following:    Height as of this encounter: 1.613 m (5' 3.5\").    Weight as of this encounter: 101.7 kg (224 lb 4.8 oz).        Health Maintenance that is potentially due pending provider review:  NONE    n/a    NAVIN Zavala  "

## 2020-02-25 ENCOUNTER — OFFICE VISIT (OUTPATIENT)
Dept: INTERNAL MEDICINE | Facility: CLINIC | Age: 80
End: 2020-02-25
Payer: COMMERCIAL

## 2020-02-25 ENCOUNTER — ANCILLARY PROCEDURE (OUTPATIENT)
Dept: GENERAL RADIOLOGY | Facility: CLINIC | Age: 80
End: 2020-02-25
Attending: PHYSICIAN ASSISTANT
Payer: COMMERCIAL

## 2020-02-25 VITALS
DIASTOLIC BLOOD PRESSURE: 78 MMHG | HEART RATE: 60 BPM | OXYGEN SATURATION: 97 % | TEMPERATURE: 98 F | SYSTOLIC BLOOD PRESSURE: 138 MMHG | WEIGHT: 224 LBS | BODY MASS INDEX: 39.06 KG/M2 | RESPIRATION RATE: 20 BRPM

## 2020-02-25 DIAGNOSIS — M25.561 ACUTE PAIN OF RIGHT KNEE: ICD-10-CM

## 2020-02-25 DIAGNOSIS — M70.51 PES ANSERINUS BURSITIS OF RIGHT KNEE: ICD-10-CM

## 2020-02-25 DIAGNOSIS — M25.561 ACUTE PAIN OF RIGHT KNEE: Primary | ICD-10-CM

## 2020-02-25 PROCEDURE — 73560 X-RAY EXAM OF KNEE 1 OR 2: CPT | Mod: RT

## 2020-02-25 PROCEDURE — 99214 OFFICE O/P EST MOD 30 MIN: CPT | Performed by: PHYSICIAN ASSISTANT

## 2020-02-25 RX ORDER — METHYLPREDNISOLONE 4 MG
TABLET, DOSE PACK ORAL
Qty: 21 TABLET | Refills: 0 | Status: SHIPPED | OUTPATIENT
Start: 2020-02-25 | End: 2020-02-25 | Stop reason: ALTCHOICE

## 2020-02-25 RX ORDER — PREDNISONE 20 MG/1
20 TABLET ORAL DAILY
Qty: 5 TABLET | Refills: 0 | Status: SHIPPED | OUTPATIENT
Start: 2020-02-25 | End: 2021-01-25

## 2020-02-25 NOTE — PROGRESS NOTES
Subjective     Shadia Gil is a 80 year old female who presents to clinic today for the following health issues:    HPI   Joint Pain    Onset: 1 week    Description:   Location: right knee medial side of knee cap  Character: Sharp and Dull ache    Intensity: severe    Progression of Symptoms: worse    Accompanying Signs & Symptoms:  Other symptoms: swelling    History:   Previous similar pain: no       Precipitating factors:   Trauma or overuse: no     Alleviating factors:  Improved by: support wrap    Therapies Tried and outcome: support wrap- helps      -------------------------------------    BP Readings from Last 3 Encounters:   02/25/20 138/78   01/24/20 136/84   12/19/18 136/70    Wt Readings from Last 3 Encounters:   02/25/20 101.6 kg (224 lb)   01/24/20 101.7 kg (224 lb 4.8 oz)   12/19/18 104.6 kg (230 lb 11.2 oz)                    -------------------------------------  Reviewed and updated as needed this visit by Provider         Review of Systems   ROS COMP: Constitutional, HEENT, cardiovascular, pulmonary, gi and gu systems are negative, except as otherwise noted.      Objective    /78 (BP Location: Left arm, Patient Position: Chair, Cuff Size: Adult Large)   Pulse 60   Temp 98  F (36.7  C) (Temporal)   Resp 20   Wt 101.6 kg (224 lb)   SpO2 97%   BMI 39.06 kg/m    Body mass index is 39.06 kg/m .  Physical Exam   GENERAL: healthy, alert and no distress  RESP: lungs clear to auscultation - no rales, rhonchi or wheezes  CV: regular rates and rhythm and normal S1 S2, no S3 or S4  MS: tenderness medial knee joint line and pes anserine bursa   No redness   SKIN: no suspicious lesions or rashes    Diagnostic Test Results:  Pending         Assessment & Plan     1. Acute pain of right knee    - XR Knee Standing Right 2 Views; Future  - predniSONE (DELTASONE) 20 MG tablet; Take 1 tablet (20 mg) by mouth daily  Dispense: 5 tablet; Refill: 0    Will notify of xray report later today via phone  "call - patient with very antalgic gait and using walker.     Patient with limited funds until next month.   Prednisone to help with symptoms.       2. Pes anserinus bursitis of right knee    - predniSONE (DELTASONE) 20 MG tablet; Take 1 tablet (20 mg) by mouth daily  Dispense: 5 tablet; Refill: 0    3: CKD   Affecting medication management      BMI:   Estimated body mass index is 39.06 kg/m  as calculated from the following:    Height as of 1/24/20: 1.613 m (5' 3.5\").    Weight as of this encounter: 101.6 kg (224 lb).   Weight management plan: Patient was referred to their PCP to discuss a diet and exercise plan.    25 minutes spent with patient> 50% of time on counseling and plan of care.      Patient Instructions   Icing.       Tylenol, can take for pain.             Return in about 2 weeks (around 3/10/2020) for recheck if not improving, regular primary provider.    Rosemary Reynolds PA-C  [facility]      "

## 2020-04-07 DIAGNOSIS — Z95.0 CARDIAC PACEMAKER IN SITU: ICD-10-CM

## 2020-04-07 DIAGNOSIS — I49.5 SICK SINUS SYNDROME (H): Primary | ICD-10-CM

## 2020-04-16 ENCOUNTER — ANCILLARY PROCEDURE (OUTPATIENT)
Dept: CARDIOLOGY | Facility: CLINIC | Age: 80
End: 2020-04-16
Attending: INTERNAL MEDICINE
Payer: COMMERCIAL

## 2020-04-16 DIAGNOSIS — Z95.0 CARDIAC PACEMAKER IN SITU: ICD-10-CM

## 2020-04-16 DIAGNOSIS — I25.750 CORONARY ARTERY DISEASE INVOLVING NATIVE ARTERY OF TRANSPLANTED HEART WITH UNSTABLE ANGINA PECTORIS (H): Primary | ICD-10-CM

## 2020-04-16 DIAGNOSIS — I49.5 SICK SINUS SYNDROME (H): ICD-10-CM

## 2020-04-16 PROCEDURE — 93294 REM INTERROG EVL PM/LDLS PM: CPT | Performed by: INTERNAL MEDICINE

## 2020-04-16 PROCEDURE — 99213 OFFICE O/P EST LOW 20 MIN: CPT | Mod: 25 | Performed by: INTERNAL MEDICINE

## 2020-04-16 PROCEDURE — 93296 REM INTERROG EVL PM/IDS: CPT | Performed by: INTERNAL MEDICINE

## 2020-04-16 NOTE — PROGRESS NOTES
"About the visit today thank you I will give you a call back my Shadia Gil is a 80 year old female who is being evaluated via a billable telephone visit.      The patient has been notified of following:     \"This telephone visit will be conducted via a call between you and your physician/provider. We have found that certain health care needs can be provided without the need for a physical exam.  This service lets us provide the care you need with a short phone conversation.  If a prescription is necessary we can send it directly to your pharmacy.  If lab work is needed we can place an order for that and you can then stop by our lab to have the test done at a later time.    Telephone visits are billed at different rates depending on your insurance coverage. During this emergency period, for some insurers they may be billed the same as an in-person visit.  Please reach out to your insurance provider with any questions.    If during the course of the call the physician/provider feels a telephone visit is not appropriate, you will not be charged for this service.\"    Patient unable to obtain blood pressure and pulse  Weight: 221lb  Patient reports having remote device check today.   Sachi Patel LPN    Patient has given verbal consent for Telephone visit?  {Yes    How would you like to obtain your AVS? Diannhart    Additional provider notes: Today at the pleasure of talking to Ms. Shadia Gil over the phone for a telephone encounter.  She is a patient who has been followed in our clinic for a long time.  She was previously seen by 1 of my colleagues who has changed practice locations.  The patient has a history of coronary disease and underwent CABG in 01/2010 with LIMA to LAD and SVG to OM 1, OM 2, RCA.  She also has history of tachybradycardia syndrome and had a pacemaker implanted.  Device interrogations have consistently shown very brief self-limiting less than 1 minute episodes of mode switches " which have been thought to be related to her history of SVT.  The most recent device interrogation which was performed on 4/16/2020 after a period of 2 years showed 14 brief episodes of mode switches, no ventricular arrhythmias.  No recent echocardiogram on file.  Most recently labs from 1/24/2020 shows LDL cholesterol of 96, normal electrolytes.    Today, the patient tells me that she has been doing well and does not have any complaints.  She walks on a daily basis, although it is limited due to a bad knee. She does not get chest pain, shortness of breath on normal exertion.  She is currently on goal-directed medical therapy with lisinopril, metoprolol, statin and aspirin.    Assessment and plan  78-year-old female with past medical history of     1.  Coronary disease status post CABG with LIMA to LAD, SVG to OM1, OM 2 and RCA  2.  Hyperlipidemia- LDL>70mg/dl  3.  Sinus node dysfunction status post PPM  4.  Nonsustained SVT    The patient is currently doing well from cardiology standpoint.  I will continue her on the current care.  Her fasting lipid profile shows mildly elevated LDL and she is currently taking pravastatin.  I discussed with her about switching to a moderate-high intensity statin such as Lipitor or Crestor. She is not interested. She is also not interested in coming in to see us in the clinic as she has been feeling well.     Plan  1.  Continue current medications  2.  Pt does not want to come in to see us. I told her to call us if she needs us.     Phone call duration: 20    Jin Jessica MD

## 2020-04-17 LAB
MDC_IDC_LEAD_IMPLANT_DT: NORMAL
MDC_IDC_LEAD_IMPLANT_DT: NORMAL
MDC_IDC_LEAD_LOCATION: NORMAL
MDC_IDC_LEAD_LOCATION: NORMAL
MDC_IDC_LEAD_LOCATION_DETAIL_1: NORMAL
MDC_IDC_LEAD_LOCATION_DETAIL_1: NORMAL
MDC_IDC_LEAD_MFG: NORMAL
MDC_IDC_LEAD_MFG: NORMAL
MDC_IDC_LEAD_MODEL: NORMAL
MDC_IDC_LEAD_MODEL: NORMAL
MDC_IDC_LEAD_POLARITY_TYPE: NORMAL
MDC_IDC_LEAD_POLARITY_TYPE: NORMAL
MDC_IDC_LEAD_SERIAL: NORMAL
MDC_IDC_LEAD_SERIAL: NORMAL
MDC_IDC_MSMT_BATTERY_DTM: NORMAL
MDC_IDC_MSMT_BATTERY_IMPEDANCE: 350 OHM
MDC_IDC_MSMT_BATTERY_REMAINING_LONGEVITY: 115 MO
MDC_IDC_MSMT_BATTERY_STATUS: NORMAL
MDC_IDC_MSMT_BATTERY_VOLTAGE: 2.79 V
MDC_IDC_MSMT_LEADCHNL_RA_IMPEDANCE_VALUE: 402 OHM
MDC_IDC_MSMT_LEADCHNL_RA_PACING_THRESHOLD_AMPLITUDE: 0.62 V
MDC_IDC_MSMT_LEADCHNL_RA_PACING_THRESHOLD_PULSEWIDTH: 0.4 MS
MDC_IDC_MSMT_LEADCHNL_RV_IMPEDANCE_VALUE: 380 OHM
MDC_IDC_MSMT_LEADCHNL_RV_PACING_THRESHOLD_AMPLITUDE: 0.5 V
MDC_IDC_MSMT_LEADCHNL_RV_PACING_THRESHOLD_PULSEWIDTH: 0.4 MS
MDC_IDC_PG_IMPLANT_DTM: NORMAL
MDC_IDC_PG_MFG: NORMAL
MDC_IDC_PG_MODEL: NORMAL
MDC_IDC_PG_SERIAL: NORMAL
MDC_IDC_PG_TYPE: NORMAL
MDC_IDC_SESS_CLINIC_NAME: NORMAL
MDC_IDC_SESS_DTM: NORMAL
MDC_IDC_SESS_TYPE: NORMAL
MDC_IDC_SET_BRADY_AT_MODE_SWITCH_MODE: NORMAL
MDC_IDC_SET_BRADY_AT_MODE_SWITCH_RATE: 175 {BEATS}/MIN
MDC_IDC_SET_BRADY_LOWRATE: 60 {BEATS}/MIN
MDC_IDC_SET_BRADY_MAX_SENSOR_RATE: 130 {BEATS}/MIN
MDC_IDC_SET_BRADY_MAX_TRACKING_RATE: 130 {BEATS}/MIN
MDC_IDC_SET_BRADY_MODE: NORMAL
MDC_IDC_SET_BRADY_PAV_DELAY_LOW: 150 MS
MDC_IDC_SET_BRADY_SAV_DELAY_LOW: 120 MS
MDC_IDC_SET_LEADCHNL_RA_PACING_AMPLITUDE: 1.5 V
MDC_IDC_SET_LEADCHNL_RA_PACING_CAPTURE_MODE: NORMAL
MDC_IDC_SET_LEADCHNL_RA_PACING_POLARITY: NORMAL
MDC_IDC_SET_LEADCHNL_RA_PACING_PULSEWIDTH: 0.4 MS
MDC_IDC_SET_LEADCHNL_RA_SENSING_POLARITY: NORMAL
MDC_IDC_SET_LEADCHNL_RA_SENSING_SENSITIVITY: 0.5 MV
MDC_IDC_SET_LEADCHNL_RV_PACING_AMPLITUDE: 2 V
MDC_IDC_SET_LEADCHNL_RV_PACING_CAPTURE_MODE: NORMAL
MDC_IDC_SET_LEADCHNL_RV_PACING_POLARITY: NORMAL
MDC_IDC_SET_LEADCHNL_RV_PACING_PULSEWIDTH: 0.4 MS
MDC_IDC_SET_LEADCHNL_RV_SENSING_POLARITY: NORMAL
MDC_IDC_SET_LEADCHNL_RV_SENSING_SENSITIVITY: 2 MV
MDC_IDC_SET_ZONE_DETECTION_INTERVAL: 333.33 MS
MDC_IDC_SET_ZONE_DETECTION_INTERVAL: 342.86 MS
MDC_IDC_SET_ZONE_TYPE: NORMAL
MDC_IDC_SET_ZONE_TYPE: NORMAL
MDC_IDC_STAT_AT_BURDEN_PERCENT: 0 %
MDC_IDC_STAT_AT_DTM_END: NORMAL
MDC_IDC_STAT_AT_DTM_START: NORMAL
MDC_IDC_STAT_AT_MODE_SW_COUNT: 14
MDC_IDC_STAT_BRADY_AP_VP_PERCENT: 0 %
MDC_IDC_STAT_BRADY_AP_VS_PERCENT: 90 %
MDC_IDC_STAT_BRADY_AS_VP_PERCENT: 0 %
MDC_IDC_STAT_BRADY_AS_VS_PERCENT: 10 %
MDC_IDC_STAT_BRADY_DTM_END: NORMAL
MDC_IDC_STAT_BRADY_DTM_START: NORMAL
MDC_IDC_STAT_EPISODE_RECENT_COUNT: 0
MDC_IDC_STAT_EPISODE_RECENT_COUNT: 0
MDC_IDC_STAT_EPISODE_RECENT_COUNT_DTM_END: NORMAL
MDC_IDC_STAT_EPISODE_RECENT_COUNT_DTM_END: NORMAL
MDC_IDC_STAT_EPISODE_RECENT_COUNT_DTM_START: NORMAL
MDC_IDC_STAT_EPISODE_RECENT_COUNT_DTM_START: NORMAL
MDC_IDC_STAT_EPISODE_TYPE: NORMAL
MDC_IDC_STAT_EPISODE_TYPE: NORMAL

## 2021-01-08 DIAGNOSIS — I25.10 CORONARY ARTERY DISEASE INVOLVING NATIVE CORONARY ARTERY OF NATIVE HEART WITHOUT ANGINA PECTORIS: ICD-10-CM

## 2021-01-08 DIAGNOSIS — I10 ESSENTIAL HYPERTENSION, BENIGN: ICD-10-CM

## 2021-01-08 RX ORDER — LISINOPRIL 10 MG/1
TABLET ORAL
Qty: 90 TABLET | Refills: 0 | Status: SHIPPED | OUTPATIENT
Start: 2021-01-08 | End: 2021-02-08

## 2021-01-08 RX ORDER — METOPROLOL TARTRATE 50 MG
TABLET ORAL
Qty: 60 TABLET | Refills: 0 | Status: SHIPPED | OUTPATIENT
Start: 2021-01-08 | End: 2021-01-25

## 2021-01-08 NOTE — TELEPHONE ENCOUNTER
"Prescription approved per [facility] Refill Protocol.    Requested Prescriptions   Pending Prescriptions Disp Refills     metoprolol tartrate (LOPRESSOR) 50 MG tablet [Pharmacy Med Name: METOPROLOL TARTRATE 50MG TABS] 60 tablet 11     Sig: TAKE ONE TABLET BY MOUTH TWICE A DAY       Beta-Blockers Protocol Passed - 1/8/2021  3:08 PM        Passed - Blood pressure under 140/90 in past 12 months     BP Readings from Last 3 Encounters:   02/25/20 138/78   01/24/20 136/84   12/19/18 136/70                 Passed - Patient is age 6 or older        Passed - Recent (12 mo) or future (30 days) visit within the authorizing provider's specialty     Patient has had an office visit with the authorizing provider or a provider within the authorizing providers department within the previous 12 mos or has a future within next 30 days. See \"Patient Info\" tab in inbasket, or \"Choose Columns\" in Meds & Orders section of the refill encounter.              Passed - Medication is active on med list           lisinopril (ZESTRIL) 10 MG tablet [Pharmacy Med Name: LISINOPRIL 10MG TABS] 90 tablet 11     Sig: TAKE TWO TABLETS BY MOUTH EVERY MORNING AND TAKE ONE TABLET BY MOUTH EVERY EVENING       ACE Inhibitors (Including Combos) Protocol Failed - 1/8/2021  3:08 PM        Failed - Normal serum creatinine on file in past 12 months     Recent Labs   Lab Test 01/24/20  1412   CR 1.07*       Ok to refill medication if creatinine is low          Passed - Blood pressure under 140/90 in past 12 months     BP Readings from Last 3 Encounters:   02/25/20 138/78   01/24/20 136/84   12/19/18 136/70                 Passed - Recent (12 mo) or future (30 days) visit within the authorizing provider's specialty     Patient has had an office visit with the authorizing provider or a provider within the authorizing providers department within the previous 12 mos or has a future within next 30 days. See \"Patient Info\" tab in inbasket, or \"Choose Columns\" in Meds & Orders " section of the refill encounter.              Passed - Medication is active on med list        Passed - Patient is age 18 or older        Passed - No active pregnancy on record        Passed - Normal serum potassium on file in past 12 months     Recent Labs   Lab Test 01/24/20  1412   POTASSIUM 4.3             Passed - No positive pregnancy test within past 12 months

## 2021-01-25 ENCOUNTER — VIRTUAL VISIT (OUTPATIENT)
Dept: INTERNAL MEDICINE | Facility: CLINIC | Age: 81
End: 2021-01-25
Payer: COMMERCIAL

## 2021-01-25 DIAGNOSIS — I25.10 CORONARY ARTERY DISEASE INVOLVING NATIVE CORONARY ARTERY OF NATIVE HEART WITHOUT ANGINA PECTORIS: ICD-10-CM

## 2021-01-25 DIAGNOSIS — I10 ESSENTIAL HYPERTENSION, BENIGN: ICD-10-CM

## 2021-01-25 DIAGNOSIS — E78.5 HYPERLIPIDEMIA LDL GOAL <100: ICD-10-CM

## 2021-01-25 PROCEDURE — 99214 OFFICE O/P EST MOD 30 MIN: CPT | Mod: 95 | Performed by: INTERNAL MEDICINE

## 2021-01-25 RX ORDER — METOPROLOL TARTRATE 50 MG
50 TABLET ORAL 2 TIMES DAILY
Qty: 60 TABLET | Refills: 5 | Status: SHIPPED | OUTPATIENT
Start: 2021-01-25 | End: 2021-08-06

## 2021-01-25 RX ORDER — PRAVASTATIN SODIUM 80 MG/1
80 TABLET ORAL DAILY
Qty: 30 TABLET | Refills: 5 | Status: SHIPPED | OUTPATIENT
Start: 2021-01-25 | End: 2021-09-10

## 2021-01-25 NOTE — PATIENT INSTRUCTIONS
"*  Continue all medications at the same doses.  Contact your usual pharmacy if you need refills.     *  Return to see me in approximately June-July 2021, sooner if needed.  Please get fasting labs done at the [facility] or any other [facility] lab 1-2 days before this appointment (schedule a \"lab appointment\").  If you get the labs done at another clinic, make arrangements with them directly.  The orders will be in place.  Eat nothing for at least 8 hours prior to having these labs drawn.  Use [employer] or Call [phone] to schedule the appointment with me and lab appointment.     *  Get the Covid 19 vaccine whenever and wherever you see it available.  I do not have specific information as to when you will be able to receive a Covid vaccine but whenever you see one available to you, I strongly recommend that you get it.   [facility] has not yet published a plan as to how we will to distribute the vaccine.  This is a fluid situation.  For the most up-to-date information regarding Covid vaccination, check with the [facility] website or [facility] website.        5 GOALS TO PREVENT VASCULAR DISEASE:     1.  Aggressive blood pressure control, under 130/80 ideally.  Using medications if needed.    Your blood pressure is under good control    BP Readings from Last 4 Encounters:   02/25/20 138/78   01/24/20 136/84   12/19/18 136/70   08/10/18 128/72       2.  Aggressive LDL cholesterol (\"bad cholesterol\") lowering as indicated.    Your goal is an LDL under 130 for sure, preferably under 100.  (If you have diabetes or previous vascular disease, the the LDL goals would be under 100 for sure, preferably under 70.)    New guidelines identify four high-risk groups who could benefit from statins:   *people with pre-existing heart disease, such as those who have had a heart attack;   *people ages 40 to 75 who have diabetes of any type  *patients ages 40 to 75 with at least a " 7.5% risk of developing cardiovascular disease over the next decade, according to a formula described in the guidelines  *patients with the sort of super-high cholesterol that sometimes runs in families, as evidenced by an LDL of 190 milligrams per deciliter or higher    Your cholesterol levels are well controlled.    Recent Labs   Lab Test 01/24/20  1412 03/19/19  1016 09/30/15  0910 09/30/15  0910 09/16/14  0844   CHOL 182 168   < > 177 167   HDL 53 49*   < > 62 49*   LDL 96 84   < > 89 82   TRIG 164* 173*   < > 128 182*   CHOLHDLRATIO  --   --   --  2.9 3.4    < > = values in this interval not displayed.       3.  Aggressive diabetic prevention, screening and/or management.      You do not have diabetes as of the most recent blood tests.     4.  No smoking    5.  Consider daily preventative aspirin over age 50 if you have enough cardiac risk factors to place you at higher risk for the presence of vascular disease.    If you have any reason not to take aspirin such easy bruising or bleeding, stomach problems, other anticoagulant medications, or any other side effects, then you should not take Aspirin.      --Based on your current cardiac risk factor profile, you should take regular daily Aspirin 81 mg once per day.

## 2021-01-25 NOTE — PROGRESS NOTES
Shadia is a 81 year old who is being evaluated via a billable telephone visit.      What phone number would you like to be contacted at? [phone]  How would you like to obtain your AVS? MyChart     Assessment & Plan     Essential hypertension, benign  This condition is currently controlled on the current medical regimen.  Continue current therapy.   - metoprolol tartrate (LOPRESSOR) 50 MG tablet  Dispense: 60 tablet; Refill: 5  - CBC with platelets  - Basic metabolic panel  - Lipid panel reflex to direct LDL Fasting  - AST  - ALT    Coronary artery disease involving native coronary artery of native heart without angina pectoris  No new sx.   Discussed secondary risk factor modification and recommended continuing aggressive management of these items.   This condition is currently controlled on the current medical regimen.  Continue current therapy.   - pravastatin (PRAVACHOL) 80 MG tablet  Dispense: 30 tablet; Refill: 5  - metoprolol tartrate (LOPRESSOR) 50 MG tablet  Dispense: 60 tablet; Refill: 5  - CBC with platelets  - Basic metabolic panel  - Lipid panel reflex to direct LDL Fasting  - AST  - ALT    Hyperlipidemia LDL goal <100  Discussed guidelines recommending a statin cholesterol lowering medication for any patient with either diabetes and/or vascular disease, aiming for a LDL goal under 100 for sure, ideally under 70, using whatever dose of statin tolerated.    - pravastatin (PRAVACHOL) 80 MG tablet  Dispense: 30 tablet; Refill: 5  - CBC with platelets  - Basic metabolic panel  - Lipid panel reflex to direct LDL Fasting  - AST  - ALT                                 Return in about 5 months (around 6/25/2021) for with pre-visit fasting labs, Medicare Annual Wellness Exam, Blood pressure.    Adryan Martinez MD  [facility]     Shadia is a 81 year old who presents to clinic today for the following health issues     HPI       Hyperlipidemia  Follow-Up      Are you regularly taking any medication or supplement to lower your cholesterol?   Yes- pravastatin    Are you having muscle aches or other side effects that you think could be caused by your cholesterol lowering medication?  No    Reviewed labs:    Recent Labs   Lab Test 01/24/20  1412 03/19/19  1016 09/30/15  0910 09/30/15  0910 09/16/14  0844   CHOL 182 168   < > 177 167   HDL 53 49*   < > 62 49*   LDL 96 84   < > 89 82   TRIG 164* 173*   < > 128 182*   CHOLHDLRATIO  --   --   --  2.9 3.4    < > = values in this interval not displayed.        Hypertension Follow-up      Do you check your blood pressure regularly outside of the clinic? No     Are you following a low salt diet? No    Are your blood pressures ever more than 140 on the top number (systolic) OR more   than 90 on the bottom number (diastolic), for example 140/90? Unknown-pt does not check bp at home    BP Readings from Last 6 Encounters:   02/25/20 138/78   01/24/20 136/84   12/19/18 136/70   08/10/18 128/72   05/24/18 140/70   01/26/18 131/62           Prior of coronary artery disease as listed in medical history.  No current or recent cardiovascaulr symptoms.   No shortness of breath, no episodes of chest pain/pressure, no dyspnea on exertion, no changes in his abiliy to perform physical exertion or tasks.  Takes the same amount of time to perform similar physical tasks.         Review of Systems   Constitutional, HEENT, cardiovascular, pulmonary, gi and gu systems are negative, except as otherwise noted.      Objective           Vitals:  No vitals were obtained today due to virtual visit.    Physical Exam   healthy, alert and no distress  PSYCH: Alert and oriented times 3; coherent speech, normal   rate and volume, able to articulate logical thoughts, able   to abstract reason, no tangential thoughts, no hallucinations   or delusions  Her affect is normal  RESP: No cough, no audible wheezing, able to talk in full sentences  Remainder  of exam unable to be completed due to telephone visits                Phone call duration: 12;30 minutes

## 2021-02-05 DIAGNOSIS — I10 ESSENTIAL HYPERTENSION, BENIGN: ICD-10-CM

## 2021-02-07 RX ORDER — LISINOPRIL 10 MG/1
TABLET ORAL
Qty: 90 TABLET | Refills: 0 | OUTPATIENT
Start: 2021-02-07

## 2021-02-08 DIAGNOSIS — I10 ESSENTIAL HYPERTENSION, BENIGN: ICD-10-CM

## 2021-02-08 RX ORDER — LISINOPRIL 10 MG/1
TABLET ORAL
Qty: 90 TABLET | Refills: 0 | Status: CANCELLED | OUTPATIENT
Start: 2021-02-08

## 2021-02-08 RX ORDER — LISINOPRIL 10 MG/1
TABLET ORAL
Qty: 180 TABLET | Refills: 0 | Status: SHIPPED | OUTPATIENT
Start: 2021-02-08 | End: 2021-04-09

## 2021-02-08 NOTE — TELEPHONE ENCOUNTER
Patient is taking 3 times daily. Previous request was denied, not sure if someone thought the refill sent 1/8 was a 90 day supply

## 2021-03-27 ENCOUNTER — HEALTH MAINTENANCE LETTER (OUTPATIENT)
Age: 81
End: 2021-03-27

## 2021-04-06 ENCOUNTER — IMMUNIZATION (OUTPATIENT)
Dept: NURSING | Facility: CLINIC | Age: 81
End: 2021-04-06
Payer: COMMERCIAL

## 2021-04-06 PROCEDURE — 0001A PR COVID VAC PFIZER DIL RECON 30 MCG/0.3 ML IM: CPT

## 2021-04-06 PROCEDURE — 91300 PR COVID VAC PFIZER DIL RECON 30 MCG/0.3 ML IM: CPT

## 2021-04-09 ENCOUNTER — TELEPHONE (OUTPATIENT)
Dept: INTERNAL MEDICINE | Facility: CLINIC | Age: 81
End: 2021-04-09

## 2021-04-09 DIAGNOSIS — I10 ESSENTIAL HYPERTENSION, BENIGN: ICD-10-CM

## 2021-04-09 RX ORDER — LISINOPRIL 10 MG/1
TABLET ORAL
Qty: 180 TABLET | Refills: 0 | Status: SHIPPED | OUTPATIENT
Start: 2021-04-09 | End: 2021-06-09

## 2021-04-09 NOTE — LETTER
April 19, 2021    Shadia Gil  [address]   [place] [facility] [place] [address]        Dear Shadia,    While refilling your prescription today, we noticed that you are due to have a IN PERSON or VIRTUAL visit with your Provider. (Video-preferred or Telephone)  We will refill your prescription for 90 days, but that appointment must be made before any additional refills can be approved.     Taking care of your health is important to us and we look forward to seeing you in the near future.  Please call us at [phone] or [phone] (or use [employer]) to schedule.  Please disregard this notice if you have already made an appointment.        Sincerely,          [facility] Team

## 2021-04-09 NOTE — TELEPHONE ENCOUNTER
Routing refill request to provider for review/approval because:  Labs out of range:  Creatinine  Creatinine   Date Value Ref Range Status   01/24/2020 1.07 (H) 0.52 - 1.04 mg/dL Final   Labs not current:  1/24/20 K+  And  CR    Patient is out of pills today.    Sneha Denise, MSN, RN  Triage RN  [facility]

## 2021-04-27 ENCOUNTER — IMMUNIZATION (OUTPATIENT)
Dept: NURSING | Facility: CLINIC | Age: 81
End: 2021-04-27
Attending: INTERNAL MEDICINE
Payer: COMMERCIAL

## 2021-04-27 PROCEDURE — 0002A PR COVID VAC PFIZER DIL RECON 30 MCG/0.3 ML IM: CPT

## 2021-04-27 PROCEDURE — 91300 PR COVID VAC PFIZER DIL RECON 30 MCG/0.3 ML IM: CPT

## 2021-06-05 DIAGNOSIS — E78.5 HYPERLIPIDEMIA LDL GOAL <100: ICD-10-CM

## 2021-06-05 DIAGNOSIS — I25.10 CORONARY ARTERY DISEASE INVOLVING NATIVE CORONARY ARTERY OF NATIVE HEART WITHOUT ANGINA PECTORIS: ICD-10-CM

## 2021-06-05 DIAGNOSIS — I10 ESSENTIAL HYPERTENSION, BENIGN: ICD-10-CM

## 2021-06-05 LAB
ALT SERPL W P-5'-P-CCNC: 17 U/L (ref 0–50)
ANION GAP SERPL CALCULATED.3IONS-SCNC: 3 MMOL/L (ref 3–14)
AST SERPL W P-5'-P-CCNC: 17 U/L (ref 0–45)
BUN SERPL-MCNC: 21 MG/DL (ref 7–30)
CALCIUM SERPL-MCNC: 9 MG/DL (ref 8.5–10.1)
CHLORIDE SERPL-SCNC: 106 MMOL/L (ref 94–109)
CHOLEST SERPL-MCNC: 196 MG/DL
CO2 SERPL-SCNC: 27 MMOL/L (ref 20–32)
CREAT SERPL-MCNC: 1.12 MG/DL (ref 0.52–1.04)
ERYTHROCYTE [DISTWIDTH] IN BLOOD BY AUTOMATED COUNT: 15.4 % (ref 10–15)
GFR SERPL CREATININE-BSD FRML MDRD: 45 ML/MIN/{1.73_M2}
GLUCOSE SERPL-MCNC: 140 MG/DL (ref 70–99)
HCT VFR BLD AUTO: 46.7 % (ref 35–47)
HDLC SERPL-MCNC: 56 MG/DL
HGB BLD-MCNC: 14.8 G/DL (ref 11.7–15.7)
LDLC SERPL CALC-MCNC: 102 MG/DL
MCH RBC QN AUTO: 29.1 PG (ref 26.5–33)
MCHC RBC AUTO-ENTMCNC: 31.7 G/DL (ref 31.5–36.5)
MCV RBC AUTO: 92 FL (ref 78–100)
NONHDLC SERPL-MCNC: 140 MG/DL
PLATELET # BLD AUTO: 220 10E9/L (ref 150–450)
POTASSIUM SERPL-SCNC: 4.3 MMOL/L (ref 3.4–5.3)
RBC # BLD AUTO: 5.08 10E12/L (ref 3.8–5.2)
SODIUM SERPL-SCNC: 136 MMOL/L (ref 133–144)
TRIGL SERPL-MCNC: 188 MG/DL
WBC # BLD AUTO: 9.2 10E9/L (ref 4–11)

## 2021-06-05 PROCEDURE — 85027 COMPLETE CBC AUTOMATED: CPT | Performed by: INTERNAL MEDICINE

## 2021-06-05 PROCEDURE — 80061 LIPID PANEL: CPT | Performed by: INTERNAL MEDICINE

## 2021-06-05 PROCEDURE — 84450 TRANSFERASE (AST) (SGOT): CPT | Performed by: INTERNAL MEDICINE

## 2021-06-05 PROCEDURE — 84460 ALANINE AMINO (ALT) (SGPT): CPT | Performed by: INTERNAL MEDICINE

## 2021-06-05 PROCEDURE — 36415 COLL VENOUS BLD VENIPUNCTURE: CPT | Performed by: INTERNAL MEDICINE

## 2021-06-05 PROCEDURE — 80048 BASIC METABOLIC PNL TOTAL CA: CPT | Performed by: INTERNAL MEDICINE

## 2021-06-07 DIAGNOSIS — I10 ESSENTIAL HYPERTENSION, BENIGN: ICD-10-CM

## 2021-06-07 NOTE — TELEPHONE ENCOUNTER
Lisinopril   Routing refill request to provider for review/approval because:  Labs out of range:  Creatinine   BP not current     Creatinine   Date Value Ref Range Status   06/05/2021 1.12 (H) 0.52 - 1.04 mg/dL Final     BP Readings from Last 3 Encounters:   02/25/20 138/78   01/24/20 136/84   12/19/18 136/70

## 2021-06-08 NOTE — TELEPHONE ENCOUNTER
Patient contacted clinic stating she is out of her medication.     Sneha Denise, Triage RN  [facility]

## 2021-06-09 RX ORDER — LISINOPRIL 10 MG/1
TABLET ORAL
Qty: 180 TABLET | Refills: 0 | Status: SHIPPED | OUTPATIENT
Start: 2021-06-09 | End: 2021-08-06

## 2021-06-09 NOTE — TELEPHONE ENCOUNTER
Patient needs medication today.  Please give pt a call at [phone] when prescription has been filled.

## 2021-08-06 DIAGNOSIS — I25.10 CORONARY ARTERY DISEASE INVOLVING NATIVE CORONARY ARTERY OF NATIVE HEART WITHOUT ANGINA PECTORIS: ICD-10-CM

## 2021-08-06 DIAGNOSIS — I10 ESSENTIAL HYPERTENSION, BENIGN: ICD-10-CM

## 2021-08-06 RX ORDER — METOPROLOL TARTRATE 50 MG
50 TABLET ORAL 2 TIMES DAILY
Qty: 60 TABLET | Refills: 0 | Status: SHIPPED | OUTPATIENT
Start: 2021-08-06 | End: 2021-09-07

## 2021-08-06 RX ORDER — LISINOPRIL 10 MG/1
TABLET ORAL
Qty: 90 TABLET | Refills: 0 | Status: SHIPPED | OUTPATIENT
Start: 2021-08-06 | End: 2021-09-07

## 2021-08-06 NOTE — TELEPHONE ENCOUNTER
LAST REFILL WITHOUT AN APPOINTMENT    OVERDUE FOR OFFICE VISIT     Last in person visit was early 2020

## 2021-08-06 NOTE — TELEPHONE ENCOUNTER
Routing refill request to provider for review/approval because:  Labs not current:    BP Readings from Last 3 Encounters:   02/25/20 138/78   01/24/20 136/84   12/19/18 136/70     Lab out of range   Creatinine   Date Value Ref Range Status   06/05/2021 1.12 (H) 0.52 - 1.04 mg/dL Final         Mehran Pepe RN  [facility] Triage Nurse

## 2021-09-05 ENCOUNTER — HEALTH MAINTENANCE LETTER (OUTPATIENT)
Age: 81
End: 2021-09-05

## 2021-09-07 DIAGNOSIS — I10 ESSENTIAL HYPERTENSION, BENIGN: ICD-10-CM

## 2021-09-07 DIAGNOSIS — I25.10 CORONARY ARTERY DISEASE INVOLVING NATIVE CORONARY ARTERY OF NATIVE HEART WITHOUT ANGINA PECTORIS: ICD-10-CM

## 2021-09-07 RX ORDER — METOPROLOL TARTRATE 50 MG
50 TABLET ORAL 2 TIMES DAILY
Qty: 60 TABLET | Refills: 0 | Status: SHIPPED | OUTPATIENT
Start: 2021-09-07 | End: 2021-09-10

## 2021-09-07 RX ORDER — LISINOPRIL 10 MG/1
TABLET ORAL
Qty: 90 TABLET | Refills: 0 | Status: SHIPPED | OUTPATIENT
Start: 2021-09-07 | End: 2021-09-10

## 2021-09-07 NOTE — TELEPHONE ENCOUNTER
1 month prescription sent electronically to the specified pharmacy.    Follow up as planned on 9/10/21

## 2021-09-07 NOTE — TELEPHONE ENCOUNTER
Patient called to check the status of this she states she is completely out of medication and needs this filled today has an appointment on 9/10    Please route back to triage as she would like a call when filled     Mehran Pepe RN

## 2021-09-10 ENCOUNTER — VIRTUAL VISIT (OUTPATIENT)
Dept: INTERNAL MEDICINE | Facility: CLINIC | Age: 81
End: 2021-09-10
Payer: COMMERCIAL

## 2021-09-10 VITALS — WEIGHT: 222 LBS | BODY MASS INDEX: 38.71 KG/M2

## 2021-09-10 DIAGNOSIS — M17.12 PRIMARY OSTEOARTHRITIS OF LEFT KNEE: ICD-10-CM

## 2021-09-10 DIAGNOSIS — I10 ESSENTIAL HYPERTENSION, BENIGN: Primary | ICD-10-CM

## 2021-09-10 DIAGNOSIS — I25.10 CORONARY ARTERY DISEASE INVOLVING NATIVE CORONARY ARTERY OF NATIVE HEART WITHOUT ANGINA PECTORIS: ICD-10-CM

## 2021-09-10 DIAGNOSIS — N18.31 STAGE 3A CHRONIC KIDNEY DISEASE (H): ICD-10-CM

## 2021-09-10 DIAGNOSIS — E66.01 SEVERE OBESITY (BMI 35.0-35.9 WITH COMORBIDITY) (H): ICD-10-CM

## 2021-09-10 DIAGNOSIS — E78.5 HYPERLIPIDEMIA LDL GOAL <100: ICD-10-CM

## 2021-09-10 DIAGNOSIS — I49.5 SICK SINUS SYNDROME (H): ICD-10-CM

## 2021-09-10 PROCEDURE — 99214 OFFICE O/P EST MOD 30 MIN: CPT | Mod: 95 | Performed by: INTERNAL MEDICINE

## 2021-09-10 RX ORDER — METOPROLOL TARTRATE 50 MG
50 TABLET ORAL 2 TIMES DAILY
Qty: 180 TABLET | Refills: 0 | Status: SHIPPED | OUTPATIENT
Start: 2021-09-10 | End: 2022-01-05

## 2021-09-10 RX ORDER — LISINOPRIL 10 MG/1
TABLET ORAL
Qty: 270 TABLET | Refills: 0 | Status: SHIPPED | OUTPATIENT
Start: 2021-09-10 | End: 2022-01-05

## 2021-09-10 RX ORDER — PRAVASTATIN SODIUM 80 MG/1
80 TABLET ORAL DAILY
Qty: 90 TABLET | Refills: 0 | Status: SHIPPED | OUTPATIENT
Start: 2021-09-10 | End: 2022-01-05

## 2021-09-10 NOTE — PROGRESS NOTES
Shadia is a 81 year old who is being evaluated via a billable telephone visit.      What phone number would you like to be contacted at? [phone]  How would you like to obtain your AVS? Umair    Assessment & Plan     (I10) Essential hypertension, benign  (primary encounter diagnosis)  Comment: This condition is currently controlled on the current medical regimen.  Continue current therapy.   Return to see us in an appointment in the office in 3 months.  She is not been seen since January 2020.  Plan: metoprolol tartrate (LOPRESSOR) 50 MG tablet,         lisinopril (ZESTRIL) 10 MG tablet            (E66.01,  Z68.35) Severe obesity (BMI 35.0-35.9 with comorbidity) (H)  Comment: Obesity is complicating degenerative joint disease and her hypertension.  Advise lower carbohydrate diet and walking as best tolerated.  Plan:     (I49.5) Sick sinus syndrome (H)  Comment: Stable, pacemaker working well.  Plan:     (I25.10) Coronary artery disease involving native coronary artery of native heart without angina pectoris  Comment: This condition is currently controlled on the current medical regimen.  Continue current therapy.   Plan: pravastatin (PRAVACHOL) 80 MG tablet,         metoprolol tartrate (LOPRESSOR) 50 MG tablet            (E78.5) Hyperlipidemia LDL goal <100  Comment: Discussed guidelines recommending a statin cholesterol lowering medication for any patient with either diabetes and/or vascular disease, aiming for a LDL goal under 100 for sure, ideally under 70, using whatever dose of statin tolerated.    Plan: pravastatin (PRAVACHOL) 80 MG tablet            (N18.31) Stage 3a chronic kidney disease  Comment:   Plan:     (M17.12) Primary osteoarthritis of left knee  Comment: Symptoms remain intermittent, no locking no severe swelling.  Does not need injection of steroids at this time likely based on her symptoms.  Plan:                     Return in about 3 months (around 12/10/2021) for in person Office visit,  Blood pressure.    Adryan Martinez MD  [facility] DIANA Reno is a 81 year old who presents for the following health issues     HPI     Hypertension Follow-up-med refills      Do you check your blood pressure regularly outside of the clinic? No     Are you following a low salt diet? Yes    Are your blood pressures ever more than 140 on the top number (systolic) OR more   than 90 on the bottom number (diastolic), for example 140/90? n/a      Not checking blood pressures at home.    BP Readings from Last 6 Encounters:   02/25/20 138/78   01/24/20 136/84   12/19/18 136/70   08/10/18 128/72   05/24/18 140/70   01/26/18 131/62         2.  History of sick sinus syndrome, status post pacemaker implantation.  Working well, reports no dizziness.    3.  Weight remains an issue.  The patient has had a history of ongoing obesity.  Reviewed the weigth curves.   Their current BMI is:  Body mass index is 38.71 kg/m .  Reviewed previous attempts at weight loss which have not been successful in producing prolonged weigth loss.   Discussed current eating and exercise habits.     Reviewed weights in chart:  Wt Readings from Last 10 Encounters:   09/10/21 100.7 kg (222 lb)   02/25/20 101.6 kg (224 lb)   01/24/20 101.7 kg (224 lb 4.8 oz)   12/19/18 104.6 kg (230 lb 11.2 oz)   08/10/18 103.9 kg (229 lb)   05/24/18 102.5 kg (226 lb)   01/26/18 96.2 kg (212 lb)   11/22/17 95.7 kg (210 lb 14.4 oz)   10/11/17 93.5 kg (206 lb 3.2 oz)   09/28/16 89.4 kg (197 lb)        4.  Reports arthritis pain in the left knee intermittently.  Occasion to swell.  Does not lock or give out.  X-rays seen in 2019 of the right knee showed moderate to severe degenerative arthritis with narrowing joint space.  She reports her right knee is fine.    5.    History of chronic kidney disease presumed due to HTN.  Reviewed most recent labs:    Lab Results   Component Value Date    CR 1.12 06/05/2021    CR 1.07  01/24/2020    CR 1.10 03/19/2019    CR 1.18 01/26/2018    CR 1.13 11/22/2017    CR 1.14 09/21/2016    CR 1.12 03/31/2016    CR 1.24 03/31/2016    CR 1.13 02/03/2016    CR 1.07 02/02/2016    CR 1.00 02/01/2016    CR 1.06 02/01/2016    CR 1.19 09/30/2015    CR 1.09 01/29/2015    CR 1.19 09/16/2014    CR 1.23 01/28/2013    CR 1.12 01/21/2013    CR 1.29 11/23/2012    CR 1.34 11/22/2012    CR 1.47 11/21/2012    CR 1.62 11/16/2012    CR 1.20 08/29/2012    CR 1.1 07/28/2011    CR 1.1 07/28/2011    CR 0.93 02/05/2011    CR 1.02 02/15/2010    CR Unsatisfactory specimen - hemolyzed 02/15/2010    CR 1.05 02/01/2010    CR 1.17 01/31/2010    CR 0.86 01/30/2010    CR 0.92 01/28/2010    CR 0.97 01/28/2010             Review of Systems   Constitutional, HEENT, cardiovascular, pulmonary, gi and gu systems are negative, except as otherwise noted.      Objective           Vitals:  No vitals were obtained today due to virtual visit.    Physical Exam   healthy, alert and no distress  PSYCH: Alert and oriented times 3; coherent speech, normal   rate and volume, able to articulate logical thoughts, able   to abstract reason, no tangential thoughts, no hallucinations   or delusions  Her affect is normal  RESP: No cough, no audible wheezing, able to talk in full sentences  Remainder of exam unable to be completed due to telephone visits                Phone call duration: 11:45 minutes

## 2021-09-10 NOTE — PATIENT INSTRUCTIONS
"*  Continue all medications at the same doses.  Contact your usual pharmacy if you need refills.     *  Covid booster shots is a very fluid topic.  As of now, they are recommended currently only recommended for those who have received the Pfizer vaccine at approximately 8 months (6 months for those who are immunocompromised).  [employer] and [employer] are still considering their recommendations for booster shots.   At this point, it appears that all of the Covid vaccines provide excellent protection from serious Covid related illness and hospitalization.    \"Breakthrough infections\" in those who have been fully vaccinated are rare, and almost always results in minimal short lived symptoms compared to those who are not vaccinated.       *  Return to see me in the office in 3 months.  (we have not seen you in person since January 2020).  Use [employer] or Call [phone] to schedule the appointment with me.           "

## 2021-10-05 ENCOUNTER — ANCILLARY PROCEDURE (OUTPATIENT)
Dept: CARDIOLOGY | Facility: CLINIC | Age: 81
End: 2021-10-05
Attending: INTERNAL MEDICINE
Payer: COMMERCIAL

## 2021-10-05 DIAGNOSIS — Z95.0 CARDIAC PACEMAKER IN SITU: ICD-10-CM

## 2021-10-05 PROCEDURE — 93296 REM INTERROG EVL PM/IDS: CPT | Performed by: INTERNAL MEDICINE

## 2021-10-05 PROCEDURE — 93294 REM INTERROG EVL PM/LDLS PM: CPT | Performed by: INTERNAL MEDICINE

## 2021-10-07 LAB
MDC_IDC_LEAD_IMPLANT_DT: NORMAL
MDC_IDC_LEAD_IMPLANT_DT: NORMAL
MDC_IDC_LEAD_LOCATION: NORMAL
MDC_IDC_LEAD_LOCATION: NORMAL
MDC_IDC_LEAD_LOCATION_DETAIL_1: NORMAL
MDC_IDC_LEAD_LOCATION_DETAIL_1: NORMAL
MDC_IDC_LEAD_MFG: NORMAL
MDC_IDC_LEAD_MFG: NORMAL
MDC_IDC_LEAD_MODEL: NORMAL
MDC_IDC_LEAD_MODEL: NORMAL
MDC_IDC_LEAD_POLARITY_TYPE: NORMAL
MDC_IDC_LEAD_POLARITY_TYPE: NORMAL
MDC_IDC_LEAD_SERIAL: NORMAL
MDC_IDC_LEAD_SERIAL: NORMAL
MDC_IDC_MSMT_BATTERY_DTM: NORMAL
MDC_IDC_MSMT_BATTERY_IMPEDANCE: 671 OHM
MDC_IDC_MSMT_BATTERY_REMAINING_LONGEVITY: 88 MO
MDC_IDC_MSMT_BATTERY_STATUS: NORMAL
MDC_IDC_MSMT_BATTERY_VOLTAGE: 2.79 V
MDC_IDC_MSMT_LEADCHNL_RA_IMPEDANCE_VALUE: 413 OHM
MDC_IDC_MSMT_LEADCHNL_RA_PACING_THRESHOLD_AMPLITUDE: 0.5 V
MDC_IDC_MSMT_LEADCHNL_RA_PACING_THRESHOLD_PULSEWIDTH: 0.4 MS
MDC_IDC_MSMT_LEADCHNL_RV_IMPEDANCE_VALUE: 399 OHM
MDC_IDC_MSMT_LEADCHNL_RV_PACING_THRESHOLD_AMPLITUDE: 0.5 V
MDC_IDC_MSMT_LEADCHNL_RV_PACING_THRESHOLD_PULSEWIDTH: 0.4 MS
MDC_IDC_PG_IMPLANT_DTM: NORMAL
MDC_IDC_PG_MFG: NORMAL
MDC_IDC_PG_MODEL: NORMAL
MDC_IDC_PG_SERIAL: NORMAL
MDC_IDC_PG_TYPE: NORMAL
MDC_IDC_SESS_CLINIC_NAME: NORMAL
MDC_IDC_SESS_DTM: NORMAL
MDC_IDC_SESS_TYPE: NORMAL
MDC_IDC_SET_BRADY_AT_MODE_SWITCH_MODE: NORMAL
MDC_IDC_SET_BRADY_AT_MODE_SWITCH_RATE: 175 {BEATS}/MIN
MDC_IDC_SET_BRADY_LOWRATE: 60 {BEATS}/MIN
MDC_IDC_SET_BRADY_MAX_SENSOR_RATE: 130 {BEATS}/MIN
MDC_IDC_SET_BRADY_MAX_TRACKING_RATE: 130 {BEATS}/MIN
MDC_IDC_SET_BRADY_MODE: NORMAL
MDC_IDC_SET_BRADY_PAV_DELAY_LOW: 150 MS
MDC_IDC_SET_BRADY_SAV_DELAY_LOW: 120 MS
MDC_IDC_SET_LEADCHNL_RA_PACING_AMPLITUDE: 1.5 V
MDC_IDC_SET_LEADCHNL_RA_PACING_CAPTURE_MODE: NORMAL
MDC_IDC_SET_LEADCHNL_RA_PACING_POLARITY: NORMAL
MDC_IDC_SET_LEADCHNL_RA_PACING_PULSEWIDTH: 0.4 MS
MDC_IDC_SET_LEADCHNL_RA_SENSING_POLARITY: NORMAL
MDC_IDC_SET_LEADCHNL_RA_SENSING_SENSITIVITY: 0.5 MV
MDC_IDC_SET_LEADCHNL_RV_PACING_AMPLITUDE: 2 V
MDC_IDC_SET_LEADCHNL_RV_PACING_CAPTURE_MODE: NORMAL
MDC_IDC_SET_LEADCHNL_RV_PACING_POLARITY: NORMAL
MDC_IDC_SET_LEADCHNL_RV_PACING_PULSEWIDTH: 0.4 MS
MDC_IDC_SET_LEADCHNL_RV_SENSING_POLARITY: NORMAL
MDC_IDC_SET_LEADCHNL_RV_SENSING_SENSITIVITY: 2 MV
MDC_IDC_SET_ZONE_DETECTION_INTERVAL: 333.33 MS
MDC_IDC_SET_ZONE_DETECTION_INTERVAL: 342.86 MS
MDC_IDC_SET_ZONE_TYPE: NORMAL
MDC_IDC_SET_ZONE_TYPE: NORMAL
MDC_IDC_STAT_AT_BURDEN_PERCENT: 0 %
MDC_IDC_STAT_AT_DTM_END: NORMAL
MDC_IDC_STAT_AT_DTM_START: NORMAL
MDC_IDC_STAT_AT_MODE_SW_COUNT: 22
MDC_IDC_STAT_BRADY_AP_VP_PERCENT: 0 %
MDC_IDC_STAT_BRADY_AP_VS_PERCENT: 92 %
MDC_IDC_STAT_BRADY_AS_VP_PERCENT: 0 %
MDC_IDC_STAT_BRADY_AS_VS_PERCENT: 8 %
MDC_IDC_STAT_BRADY_DTM_END: NORMAL
MDC_IDC_STAT_BRADY_DTM_START: NORMAL
MDC_IDC_STAT_EPISODE_RECENT_COUNT: 0
MDC_IDC_STAT_EPISODE_RECENT_COUNT: 0
MDC_IDC_STAT_EPISODE_RECENT_COUNT_DTM_END: NORMAL
MDC_IDC_STAT_EPISODE_RECENT_COUNT_DTM_END: NORMAL
MDC_IDC_STAT_EPISODE_RECENT_COUNT_DTM_START: NORMAL
MDC_IDC_STAT_EPISODE_RECENT_COUNT_DTM_START: NORMAL
MDC_IDC_STAT_EPISODE_TYPE: NORMAL
MDC_IDC_STAT_EPISODE_TYPE: NORMAL

## 2021-11-24 ENCOUNTER — DOCUMENTATION ONLY (OUTPATIENT)
Dept: LAB | Facility: CLINIC | Age: 81
End: 2021-11-24
Payer: COMMERCIAL

## 2021-11-24 DIAGNOSIS — I10 ESSENTIAL HYPERTENSION, BENIGN: ICD-10-CM

## 2021-11-24 DIAGNOSIS — I25.10 CORONARY ARTERY DISEASE INVOLVING NATIVE CORONARY ARTERY OF NATIVE HEART WITHOUT ANGINA PECTORIS: ICD-10-CM

## 2021-11-24 DIAGNOSIS — Z13.6 CARDIOVASCULAR SCREENING; LDL GOAL LESS THAN 100: Primary | ICD-10-CM

## 2021-11-24 DIAGNOSIS — E78.5 HYPERLIPIDEMIA LDL GOAL <100: ICD-10-CM

## 2021-11-26 ENCOUNTER — LAB (OUTPATIENT)
Dept: LAB | Facility: CLINIC | Age: 81
End: 2021-11-26
Payer: COMMERCIAL

## 2021-11-26 DIAGNOSIS — I10 ESSENTIAL HYPERTENSION, BENIGN: ICD-10-CM

## 2021-11-26 DIAGNOSIS — Z13.6 CARDIOVASCULAR SCREENING; LDL GOAL LESS THAN 100: ICD-10-CM

## 2021-11-26 DIAGNOSIS — E78.5 HYPERLIPIDEMIA LDL GOAL <100: ICD-10-CM

## 2021-11-26 DIAGNOSIS — N18.32 STAGE 3B CHRONIC KIDNEY DISEASE (H): Primary | ICD-10-CM

## 2021-11-26 DIAGNOSIS — I25.10 CORONARY ARTERY DISEASE INVOLVING NATIVE CORONARY ARTERY OF NATIVE HEART WITHOUT ANGINA PECTORIS: ICD-10-CM

## 2021-11-26 LAB
ALT SERPL W P-5'-P-CCNC: 19 U/L (ref 0–50)
ANION GAP SERPL CALCULATED.3IONS-SCNC: 1 MMOL/L (ref 3–14)
BUN SERPL-MCNC: 33 MG/DL (ref 7–30)
CALCIUM SERPL-MCNC: 9.8 MG/DL (ref 8.5–10.1)
CHLORIDE BLD-SCNC: 108 MMOL/L (ref 94–109)
CHOLEST SERPL-MCNC: 172 MG/DL
CO2 SERPL-SCNC: 30 MMOL/L (ref 20–32)
CREAT SERPL-MCNC: 1.29 MG/DL (ref 0.52–1.04)
CREAT UR-MCNC: 127 MG/DL
FASTING STATUS PATIENT QL REPORTED: YES
GFR SERPL CREATININE-BSD FRML MDRD: 38 ML/MIN/1.73M2
GLUCOSE BLD-MCNC: 86 MG/DL (ref 70–99)
HDLC SERPL-MCNC: 53 MG/DL
HGB BLD-MCNC: 14.8 G/DL (ref 11.7–15.7)
LDLC SERPL CALC-MCNC: 78 MG/DL
MICROALBUMIN UR-MCNC: 18 MG/L
MICROALBUMIN/CREAT UR: 14.17 MG/G CR (ref 0–25)
NONHDLC SERPL-MCNC: 119 MG/DL
POTASSIUM BLD-SCNC: 5.6 MMOL/L (ref 3.4–5.3)
SODIUM SERPL-SCNC: 139 MMOL/L (ref 133–144)
TRIGL SERPL-MCNC: 205 MG/DL

## 2021-11-26 PROCEDURE — 85018 HEMOGLOBIN: CPT

## 2021-11-26 PROCEDURE — 82043 UR ALBUMIN QUANTITATIVE: CPT

## 2021-11-26 PROCEDURE — 84460 ALANINE AMINO (ALT) (SGPT): CPT

## 2021-11-26 PROCEDURE — 36415 COLL VENOUS BLD VENIPUNCTURE: CPT

## 2021-11-26 PROCEDURE — 80061 LIPID PANEL: CPT

## 2021-11-26 PROCEDURE — 80048 BASIC METABOLIC PNL TOTAL CA: CPT

## 2021-11-26 NOTE — LETTER
"November 28, 2021      Shadia Rosendo Gil  [address]    [place] [facility][1] [id number]        Dear ,    We are writing to inform you of your test results.    Your labs all looked mostly good.  The potassium level and kidney function (creatinine) are slightly higher than last time.  Many times this can be due to relative lack of fluid intake.  Please try to remain well hydrated, try to drink at least 6-8 glasses of water each day.  Continue all medications at the same doses.  Contact your usual pharmacy if you need refills.   Return for a \"lab only appointment\" in approximately 1 month to recheck labs (nonfasting).  I will make contact either via phone or [employer] regarding the results and any recommendations once I have reviewed them.      Resulted Orders   Hemoglobin   Result Value Ref Range    Hemoglobin 14.8 11.7 - 15.7 g/dL   Basic metabolic panel   Result Value Ref Range    Sodium 139 133 - 144 mmol/L    Potassium 5.6 (H) 3.4 - 5.3 mmol/L    Chloride 108 94 - 109 mmol/L    Carbon Dioxide (CO2) 30 20 - 32 mmol/L    Anion Gap 1 (L) 3 - 14 mmol/L    Urea Nitrogen 33 (H) 7 - 30 mg/dL    Creatinine 1.29 (H) 0.52 - 1.04 mg/dL    Calcium 9.8 8.5 - 10.1 mg/dL    Glucose 86 70 - 99 mg/dL    GFR Estimate 38 (L) >60 mL/min/1.73m2      Comment:      As of July 11, 2021, eGFR is calculated by the CKD-EPI creatinine equation, without race adjustment. eGFR can be influenced by muscle mass, exercise, and diet. The reported eGFR is an estimation only and is only applicable if the renal function is stable.   Lipid panel reflex to direct LDL Fasting   Result Value Ref Range    Cholesterol 172 <200 mg/dL    Triglycerides 205 (H) <150 mg/dL    Direct Measure HDL 53 >=50 mg/dL    LDL Cholesterol Calculated 78 <=100 mg/dL    Non HDL Cholesterol 119 <130 mg/dL    Patient Fasting > 8hrs? Yes                 Narrative    Cholesterol  Desirable:  <200 mg/dL    Triglycerides  Normal:  Less than 150 mg/dL  Borderline " High:  150-199 mg/dL  High:  200-499 mg/dL  Very High:  Greater than or equal to 500 mg/dL    Direct Measure HDL  Female:  Greater than or equal to 50 mg/dL   Male:  Greater than or equal to 40 mg/dL    LDL Cholesterol  Desirable:  <100mg/dL  Above Desirable:  100-129 mg/dL   Borderline High:  130-159 mg/dL   High:  160-189 mg/dL   Very High:  >= 190 mg/dL    Non HDL Cholesterol  Desirable:  130 mg/dL  Above Desirable:  130-159 mg/dL  Borderline High:  160-189 mg/dL  High:  190-219 mg/dL  Very High:  Greater than or equal to 220 mg/dL   ALT   Result Value Ref Range    ALT 19 0 - 50 U/L   Albumin Random Urine Quantitative with Creat Ratio   Result Value Ref Range    Creatinine Urine mg/dL 127 mg/dL    Albumin Urine mg/L 18 mg/L    Albumin Urine mg/g Cr 14.17 0.00 - 25.00 mg/g Cr       If you have any questions or concerns, please call the clinic at the number listed above.       Sincerely,      Adryan Martinez MD

## 2022-01-03 DIAGNOSIS — I25.10 CORONARY ARTERY DISEASE INVOLVING NATIVE CORONARY ARTERY OF NATIVE HEART WITHOUT ANGINA PECTORIS: ICD-10-CM

## 2022-01-03 DIAGNOSIS — I10 ESSENTIAL HYPERTENSION, BENIGN: ICD-10-CM

## 2022-01-03 DIAGNOSIS — E78.5 HYPERLIPIDEMIA LDL GOAL <100: ICD-10-CM

## 2022-01-05 RX ORDER — PRAVASTATIN SODIUM 80 MG/1
TABLET ORAL
Qty: 90 TABLET | Refills: 2 | Status: SHIPPED | OUTPATIENT
Start: 2022-01-05 | End: 2023-03-03

## 2022-01-05 RX ORDER — LISINOPRIL 10 MG/1
TABLET ORAL
Qty: 270 TABLET | Refills: 0 | Status: SHIPPED | OUTPATIENT
Start: 2022-01-05 | End: 2022-04-05

## 2022-01-05 RX ORDER — METOPROLOL TARTRATE 50 MG
TABLET ORAL
Qty: 180 TABLET | Refills: 0 | Status: SHIPPED | OUTPATIENT
Start: 2022-01-05 | End: 2022-04-05

## 2022-01-05 NOTE — TELEPHONE ENCOUNTER
Routing refill request to provider for review/approval because:  Labs out of range:    Potassium   Date Value Ref Range Status   11/26/2021 5.6 (H) 3.4 - 5.3 mmol/L Final   06/05/2021 4.3 3.4 - 5.3 mmol/L Final     Creatinine   Date Value Ref Range Status   11/26/2021 1.29 (H) 0.52 - 1.04 mg/dL Final   06/05/2021 1.12 (H) 0.52 - 1.04 mg/dL Final     BP Readings from Last 3 Encounters:   02/25/20 138/78   01/24/20 136/84   12/19/18 136/70     Last virtual visit- 9/10/21- advised to follow up in three months in clinic at this visit. No future visits scheduled. Future BMP in place.   please route to team for patient to schedule labs follow up       Randa Laird RN

## 2022-04-04 DIAGNOSIS — I10 ESSENTIAL HYPERTENSION, BENIGN: ICD-10-CM

## 2022-04-04 DIAGNOSIS — I25.10 CORONARY ARTERY DISEASE INVOLVING NATIVE CORONARY ARTERY OF NATIVE HEART WITHOUT ANGINA PECTORIS: ICD-10-CM

## 2022-04-05 RX ORDER — LISINOPRIL 10 MG/1
TABLET ORAL
Qty: 270 TABLET | Refills: 0 | Status: SHIPPED | OUTPATIENT
Start: 2022-04-05 | End: 2022-07-06

## 2022-04-05 RX ORDER — METOPROLOL TARTRATE 50 MG
TABLET ORAL
Qty: 180 TABLET | Refills: 0 | Status: SHIPPED | OUTPATIENT
Start: 2022-04-05 | End: 2022-07-06

## 2022-04-05 NOTE — TELEPHONE ENCOUNTER
Routing refill request to provider for review/approval because:    Labs out of range:   Creatinine   Date Value Ref Range Status   11/26/2021 1.29 (H) 0.52 - 1.04 mg/dL Final   06/05/2021 1.12 (H) 0.52 - 1.04 mg/dL Final     Potassium   Date Value Ref Range Status   11/26/2021 5.6 (H) 3.4 - 5.3 mmol/L Final   06/05/2021 4.3 3.4 - 5.3 mmol/L Final      Labs not current:    BP Readings from Last 3 Encounters:   02/25/20 138/78   01/24/20 136/84   12/19/18 136/70      Patient Last seen by PCP virtually on 09/10/2021    Yumi Toure RN

## 2022-04-08 ENCOUNTER — NURSE TRIAGE (OUTPATIENT)
Dept: INTERNAL MEDICINE | Facility: CLINIC | Age: 82
End: 2022-04-08
Payer: COMMERCIAL

## 2022-04-08 DIAGNOSIS — Z91.89 AT RISK FOR SEXUALLY TRANSMITTED DISEASE DUE TO UNPROTECTED SEX: Primary | ICD-10-CM

## 2022-04-08 DIAGNOSIS — Z11.3 SCREEN FOR STD (SEXUALLY TRANSMITTED DISEASE): ICD-10-CM

## 2022-04-08 NOTE — TELEPHONE ENCOUNTER
Patient Contact    Attempt # 1    Was call answered?  No.  Left message on voicemail with information to call me back.    Zeinab Dove RN

## 2022-04-08 NOTE — TELEPHONE ENCOUNTER
Patient called in due to wanting STD testing done.     Patient is not having any symptoms but did have unprotected sex on  3/29. He is with one other woman that she knows of and did not ask if he had any history of STDS.     Offered virtual visit to discuss with Dr. Martinez which patient declined. She would like to know if Dr. Martinez can order STD testing for her and when he recommends she gets tested     Please advise     Okay to leave detailed message  Mehran Pepe RN      Reason for Disposition    Patient is worried about a sexually transmitted disease (STD)    Additional Information    Negative: Rash or sores on penis or scrotum    Negative: Rash or sores on female genital area (vulvar area)    Negative: Forced to have sex (sexual assault or rape) in the past 7 days    Negative: Sexual intercourse (in the past 72 hours) with someone who was diagnosed with HIV    Negative: Female and EITHER of the following: * Constant lower abdominal pain lasting more than 2 hours* Unable to urinate (or only a few drops) and bladder feels very full    Negative: Male and EITHER of the following: * Fever and testicle pain or swelling* Unable to urinate (or only a few drops) and bladder feels very full    Negative: Fever and burning (pain) with urination    Negative: Forced to have sex (sexual assault or rape) > 7 days ago    Negative: Female and ANY of the following: * Burning (pain) with urination * Unexplained lower abdominal pain * Abnormal color of vaginal discharge (i.e., yellow, green, gray) * Bad-smelling vaginal discharge    Negative: Male with ANY of the following: * Burning (pain) with urination * Pus (white, yellow) or bloody discharge from end of penis* Testicle pain or swelling    Negative: Rectal discharge or unusual rectal pain or itching    Negative: Patient wants to be seen    Protocols used: STD EXPOSURE AND PREVENTION-A-OH

## 2022-04-08 NOTE — TELEPHONE ENCOUNTER
"OK to come in for these labs in a \"lab only\" appointment.   I will let her know the results when they return.     The blood tests will check for prior exposure to herpes, IV, syphilis.   The urine tests will check for any current infection with gonorrhea or chlamydia.     She is due for an in person office visit this spring as I have not seen in her in person since Jan 2020.    OK to place her in any same day spot or virtual spot if she wants to schedule something now.     She is due for another BMP about now anyway, this can be done at the same time as her labs above.   (I had told her this in her virtual visit Sept 2021)  "

## 2022-07-02 DIAGNOSIS — I10 ESSENTIAL HYPERTENSION, BENIGN: ICD-10-CM

## 2022-07-02 DIAGNOSIS — I25.10 CORONARY ARTERY DISEASE INVOLVING NATIVE CORONARY ARTERY OF NATIVE HEART WITHOUT ANGINA PECTORIS: ICD-10-CM

## 2022-07-06 RX ORDER — METOPROLOL TARTRATE 50 MG
TABLET ORAL
Qty: 180 TABLET | Refills: 0 | Status: SHIPPED | OUTPATIENT
Start: 2022-07-06 | End: 2022-10-05

## 2022-07-06 RX ORDER — LISINOPRIL 10 MG/1
TABLET ORAL
Qty: 270 TABLET | Refills: 0 | Status: SHIPPED | OUTPATIENT
Start: 2022-07-06 | End: 2022-10-05

## 2022-07-06 NOTE — TELEPHONE ENCOUNTER
Routing refill request to provider for review/approval because:  Labs out of range:     Labs not current:        ACE Inhibitors (Including Combos) Protocol Failed        Blood pressure under 140/90 in past 12 months        BP Readings from Last 3 Encounters:   02/25/20 138/78   01/24/20 136/84   12/19/18 136/70             Normal serum creatinine on file in past 12 months        Recent Labs   Lab Test 11/26/21  1621   CR 1.29*      Ok to refill medication if creatinine is low      Normal serum potassium on file in past 12 months        Recent Labs   Lab Test 11/26/21  1621   POTASSIUM 5.6*     Migdalia Acevedo RN

## 2022-10-03 DIAGNOSIS — I25.10 CORONARY ARTERY DISEASE INVOLVING NATIVE CORONARY ARTERY OF NATIVE HEART WITHOUT ANGINA PECTORIS: ICD-10-CM

## 2022-10-03 DIAGNOSIS — I10 ESSENTIAL HYPERTENSION, BENIGN: ICD-10-CM

## 2022-10-05 RX ORDER — METOPROLOL TARTRATE 50 MG
TABLET ORAL
Qty: 180 TABLET | Refills: 0 | Status: SHIPPED | OUTPATIENT
Start: 2022-10-05 | End: 2023-01-02

## 2022-10-05 RX ORDER — LISINOPRIL 10 MG/1
TABLET ORAL
Qty: 270 TABLET | Refills: 0 | Status: SHIPPED | OUTPATIENT
Start: 2022-10-05 | End: 2023-01-02

## 2022-10-05 NOTE — TELEPHONE ENCOUNTER
Routing refill request to provider for review/approval because:  Failed protocol due to:   BP Readings from Last 3 Encounters:   02/25/20 138/78   01/24/20 136/84   12/19/18 136/70     Creatinine   Date Value Ref Range Status   11/26/2021 1.29 (H) 0.52 - 1.04 mg/dL Final   06/05/2021 1.12 (H) 0.52 - 1.04 mg/dL Final     Potassium   Date Value Ref Range Status   11/26/2021 5.6 (H) 3.4 - 5.3 mmol/L Final   06/05/2021 4.3 3.4 - 5.3 mmol/L Final     Chikis Guzman RN

## 2022-10-05 NOTE — TELEPHONE ENCOUNTER
Prescription(s) sent electronically to specified pharmacy.  (this was just sent to me 2 hours ago)

## 2022-10-05 NOTE — TELEPHONE ENCOUNTER
Patient called pharmacy - is out of medication and would like meds refilled as soon as possible.  Rerouting as high priority.    Gautam King, PharmD  [facility]  [phone]

## 2022-12-16 NOTE — TELEPHONE ENCOUNTER
"Dr. Albina Moseley  [address] [facility] [address]    RE:  Sheila Buenrostro  2007  [id number]    Dear Dr. Moseley:    I had the pleasure of seeing your patient, Sheila, today through the [facility] in consultation for the question of Nocturnal Enuresis.  Please see below the details of this visit and my impression and plans discussed with the family.    CC:  Nocturnal Enuresis  Also concerns of nocturia.  Occurs 3-4 times a week.  Has been ongoing for several years.  Seems to go in \"spurts\" followed be nighttime dry for 2 weeks to 2 months.  Then nocturia will return for several weeks to months. No daytime wetness but occasional urgency. Stools daily up to 3 times a day.     HPI:  Sheila is a 15 year old  child whom I was asked to see in consultation for the above.    History of PCOS, seeing weight management and endocrine.    Nights per week wet: 1-2/7  In the last week  Longest dry period: 2 month(s)  Changes over time  Age toilet trained: 2-3 yrs easy  Dry after toilet trained: No  Daytime incontinence: No   Typical voiding schedule:  5 times per day  Urgency: mostly after school 1-2 times a week; Dysuria: absent; Polyuria: absent; Polydipsia: absent; Urine Stream: Normal  Feels empty at the end of voids:  YES  Previous UTI's: No   Trauma HX: No  Deep sleeper: Yes  Constipation: as a baby- sometimes now  Snores: No  ADHD: suspected- no testing or official diagnosis  Fluid Intake after Dinner: moderate  How long after going to bed: unsure   Sleeps through the night: Yes  Sheila cleans her self and bedding if she awakes to wetness  Awakens to full bladder: a couple of times    Self awakens to wetness: no  Things Tried: alarm setting, restriction of fluids at night and avoidance of caffeine    Daily fluid intake-   Water:  60 ounces   Milk:  8-16 ounces   Other:  Pop/Juice/Body Armor but not everyday    Current bowel habits-  Stools Every other day  Type 4 on the " Pt is now out    "[place] Stool Scale  Large:  YES  Clogs the toilets:  YES  Pain:  Sometimes  Strain:  Sometimes  Blood in stool:  No  Soiling accidents:  No    Sheila did met all developmental milestones appropriately and can keep up physically with peers.     Social History: Lives with mom, dad, older brother, and grandmother.    PMH:  PCOS  PSH:   No past surgical history on file. No surgery    Meds, allergies, family history, social history reviewed per intake form.    ROS:  Negative on a 12-point scale, except for URI and ear infection within the last month.  All other pertinent positives mentioned in the HPI.    PE:  Blood pressure 135/86, pulse 77, height 1.724 m (5' 7.87\"), weight 85.1 kg (187 lb 9.8 oz).  5' 7.874\"  187 lbs 9.78 oz  General:  Well-appearing child, in no apparent distress.  HEENT:  Normocephalic, normal facies  Resp:  Symmetric chest wall movement, no audible respirations  Abd:  Soft, non-tender, non-distended, no palpable masses  Genitalia:  Deferred per patient's request  Spine:  Straight  Neuromuscular:  Muscles symmetrically bulked/developed  Ext:  Full range of motion  Skin:  Warm, well-perfused    Impression:  Primary nocturnal enuresis  Discussion:  Primary nocturnal enuresis: Approximately 13-20% of 5 year olds, 10% of 7 year olds, 5% of 10 year olds, and 1-2% of 15 year olds experience nocturnal enuresis.  Enuresis resolves on its own in most children.  Boys are twice as likely as girls to wet the bed.  A familial history of bedwetting increases one's likelihood of wetting the bed as well.  Constipation is often a contributing factor, and often goes unnoticed.  Enuresis alarms are the most effective means of controlling nocturnal enuresis and preventing relapse; this is especially true when coupled with management of constipation.  Alarms work best for well-motivated families and children with frequent enuresis (more than twice per week).      Plan:    1. Initiate timed voiding every 2-3 hours " "throughout the day.  2. Consume 2/3 of appropriate daily fluid intake before the end of the school day and 1/3 of daily fluids in the evening. Limit fluid consumption in the last hour before bed.  3. Avoid dietary bladder irritants in the evening, including caffeine, carbonation, sports drinks, citrus, artificial sweeteners, chocolate and excessive dairy.  4. Establish a stable and reliable bedtime routine and wake schedule.       -Try Double voiding before bed to fully empty the bladder      -Positive self talk/ meditation a few minutes before bed can help strengthen the \"mind bladder\" connection.   5. Empty bladder before going to sleep and anytime awake during the night.  6. Monitor and provide intervention if necessary to maintain soft, barely formed bowel movements daily. Constipation is often a contributing factor, and often goes unnoticed.       1 capful of miralax daily  7. Track and praise dry nights.    8. Check local library for a copy of \"Waking Up Dry\" by Dr.Howard Reyes, it is a good resource when considering purchasing/using a bedwetting alarm.   9. Trial of bedwetting alarm. Child must be an active and motivated participant. The child may not awaken initially, parents should awaken the child when the alarm sounds. Upon awakening Sheila should void in the bathroom and assist parents in changing bed sheets prior to returning to sleep. Use of the alarm may take weeks to months to work and should be used for at least 2-3 months. Once effective continue using for at least 14 consecutive dry nights.   10.  Alarms, as well as additional resources are available from several web sites including:    www.pottymd.com  www.bedwettingstore.com   www.bedwettingtherapy.com   www.bedwettingandaccidents.com  www.dryatnight.com    I discussed the causes and normalcy of primary nocturnal enuresis by age group.  Treatments including: behavioral modifications, the bedwetting alarm, DDAVP, and observation were discussed " in detail.     Thank you very much for allowing me the opportunity to participate in this nice family's care with you.    47 minutes spent on the date of the encounter doing chart review, history and exam, documentation, education and further activities per the note.    Sincerely,  Karley Duncan, MSN, APRN, CPNP  Pediatric Urology  [facility]

## 2023-01-02 DIAGNOSIS — I10 ESSENTIAL HYPERTENSION, BENIGN: ICD-10-CM

## 2023-01-02 DIAGNOSIS — I25.10 CORONARY ARTERY DISEASE INVOLVING NATIVE CORONARY ARTERY OF NATIVE HEART WITHOUT ANGINA PECTORIS: ICD-10-CM

## 2023-01-02 RX ORDER — LISINOPRIL 10 MG/1
TABLET ORAL
Qty: 270 TABLET | Refills: 0 | Status: SHIPPED | OUTPATIENT
Start: 2023-01-02 | End: 2023-04-04

## 2023-01-02 RX ORDER — METOPROLOL TARTRATE 50 MG
TABLET ORAL
Qty: 180 TABLET | Refills: 0 | Status: SHIPPED | OUTPATIENT
Start: 2023-01-02 | End: 2023-04-04

## 2023-03-03 DIAGNOSIS — I25.10 CORONARY ARTERY DISEASE INVOLVING NATIVE CORONARY ARTERY OF NATIVE HEART WITHOUT ANGINA PECTORIS: ICD-10-CM

## 2023-03-03 DIAGNOSIS — E78.5 HYPERLIPIDEMIA LDL GOAL <100: ICD-10-CM

## 2023-03-03 RX ORDER — PRAVASTATIN SODIUM 80 MG/1
TABLET ORAL
Qty: 90 TABLET | Refills: 2 | Status: SHIPPED | OUTPATIENT
Start: 2023-03-03 | End: 2024-03-14

## 2023-03-11 NOTE — PROGRESS NOTES
I see multiple mode switches- there are no EGMs- do we presume these are brief runs of SVT? I ask if we need to surveil for a fib   English

## 2023-04-03 DIAGNOSIS — I10 ESSENTIAL HYPERTENSION, BENIGN: ICD-10-CM

## 2023-04-03 DIAGNOSIS — I25.10 CORONARY ARTERY DISEASE INVOLVING NATIVE CORONARY ARTERY OF NATIVE HEART WITHOUT ANGINA PECTORIS: ICD-10-CM

## 2023-04-04 ENCOUNTER — NURSE TRIAGE (OUTPATIENT)
Dept: NURSING | Facility: CLINIC | Age: 83
End: 2023-04-04
Payer: COMMERCIAL

## 2023-04-04 ENCOUNTER — TELEPHONE (OUTPATIENT)
Dept: NURSING | Facility: CLINIC | Age: 83
End: 2023-04-04
Payer: COMMERCIAL

## 2023-04-04 RX ORDER — METOPROLOL TARTRATE 50 MG
TABLET ORAL
Qty: 180 TABLET | Refills: 0 | Status: SHIPPED | OUTPATIENT
Start: 2023-04-04 | End: 2023-07-05

## 2023-04-04 RX ORDER — LISINOPRIL 10 MG/1
TABLET ORAL
Qty: 270 TABLET | Refills: 0 | Status: SHIPPED | OUTPATIENT
Start: 2023-04-04 | End: 2023-07-05

## 2023-04-04 NOTE — TELEPHONE ENCOUNTER
Clinic Action Needed:Yes  Reason for Call: Shadia calls and says that she needs a refill of her Lisinopril and her Metoprolol. RN then left this message, in Epic, for Dr. Bishop Martinez, as requested.   Routed to:  Triage IM x  Whit Miller RN   [facility]  [phone]

## 2023-04-04 NOTE — TELEPHONE ENCOUNTER
Shadia calls and says that she needs a refill of her Liisinopril and her Metoprolol. RN then left this message, in Epic, for Dr. Bishop Martinez, as requested. COVID 19 Nurse Triage Plan/Patient Instructions    Please be aware that novel coronavirus (COVID-19) may be circulating in the community. If you develop symptoms such as fever, cough, or SOB or if you have concerns about the presence of another infection including coronavirus (COVID-19), please contact your health care provider or visit [email or web address].     Disposition/Instructions    Home care recommended. Follow home care protocol based instructions.    Thank you for taking steps to prevent the spread of this virus.  o Limit your contact with others.  o Wear a simple mask to cover your cough.  o Wash your hands well and often.    Resources    [facility]: About COVID-19: [email or web address]    CDC: What to Do If You're Sick: www.cdc.gov/coronavirus/2019-ncov/about/steps-when-sick.html    CDC: Ending Home Isolation: www.cdc.gov/coronavirus/2019-ncov/hcp/disposition-in-home-patients.html     CDC: Caring for Someone: www.cdc.gov/coronavirus/2019-ncov/if-you-are-sick/care-for-someone.html     [facility]: Interim Guidance for Hospital Discharge to Home: www.health.[facility].mn.us/diseases/coronavirus/hcp/hospdischarge.pdf    [facility] clinical trials (COVID-19 research studies): clinicalaffairs.[facility]-clinical-trials     Below are the COVID-19 hotlines at the [facility] of Health ([facility]). Interpreters are available.   o For health questions: Call 563-798-8809 or 1-935.433.7634 (7 a.m. to 7 p.m.)  o For questions about schools and childcare: Call 803-111-2334 or 1-293.853.6963 (7 a.m. to 7 p.m.)                     Reason for Disposition    Health Information question, no triage required and triager able to answer question    Additional Information    Negative: [1] Caller is not with the adult (patient) AND  [2] reporting urgent symptoms    Negative: Lab result questions    Negative: Medication questions    Negative: Caller can't be reached by phone    Negative: Caller has already spoken to PCP or another triager    Negative: RN needs further essential information from caller in order to complete triage    Negative: Requesting regular office appointment    Negative: [1] Caller requesting NON-URGENT health information AND [2] PCP's office is the best resource    Protocols used: INFORMATION ONLY CALL - NO TRIAGE-A-

## 2023-04-05 NOTE — TELEPHONE ENCOUNTER
Upon chart review, both the Lisinopril and Metoprolol were refilled on 4/4/23 and sent to the pharmacy.     Called and spoke to the patient. Patient states she picked up her medications already. Patient expressed some frustration as she only requested a 30 day supply because she cannot afford 90 day supply. Patient states she informed the nurse of this information twice. Triage RN advised patient if this happens again in the future, ask the pharmacy to send a message and we can make sure a script is only filed for 30 days. Patient expressed understanding and had no additional questions at this time.    Chikis Guzman, RN

## 2023-07-03 DIAGNOSIS — N18.32 STAGE 3B CHRONIC KIDNEY DISEASE (H): ICD-10-CM

## 2023-07-03 DIAGNOSIS — I10 ESSENTIAL HYPERTENSION, BENIGN: ICD-10-CM

## 2023-07-03 DIAGNOSIS — K21.9 GASTROESOPHAGEAL REFLUX DISEASE WITHOUT ESOPHAGITIS: ICD-10-CM

## 2023-07-03 DIAGNOSIS — E78.5 HYPERLIPIDEMIA LDL GOAL <100: Primary | ICD-10-CM

## 2023-07-03 DIAGNOSIS — I25.10 CORONARY ARTERY DISEASE INVOLVING NATIVE CORONARY ARTERY OF NATIVE HEART WITHOUT ANGINA PECTORIS: ICD-10-CM

## 2023-07-03 NOTE — LETTER
July 5, 2023      Shadia Rosendo Cabrera  [address]    [facility] [address]        Dear ,    In processing your recent request for a refill of Lisinopril and Metoprolol, I noticed that I have not seen you in person in an office appointment since January 2020 and not even in a virtual appointment since September 2021.    I sent a one month prescription to your pharmacy, but will need to see you again in the office before I can provide future refills.    Because our appointments are booking out several weeks, I will have my staff contact you to help arrange an office appointment to see us (and for a lab appointment if labs before the appointment).     If you have any questions or concerns, please call the clinic at the number listed above.       Sincerely,      Adryan Martinez M.D.  Dept. of Internal Medicine  [facility]                [General Appearance - Well Developed] : well developed [Normal Appearance] : normal appearance [General Appearance - Well Nourished] : well nourished [Heart Rate And Rhythm] : heart rate and rhythm were normal [Heart Sounds] : normal S1 and S2 [Arterial Pulses Normal] : the arterial pulses were normal [] : no respiratory distress [Respiration, Rhythm And Depth] : normal respiratory rhythm and effort [Auscultation Breath Sounds / Voice Sounds] : lungs were clear to auscultation bilaterally [Left Infraclavicular] : left infraclavicular area [Clean] : clean [Dry] : dry [Well-Healed] : well-healed [Bowel Sounds] : normal bowel sounds [Abdomen Soft] : soft [Abdomen Tenderness] : non-tender [Nail Clubbing] : no clubbing of the fingernails [Cyanosis, Localized] : no localized cyanosis

## 2023-07-05 RX ORDER — METOPROLOL TARTRATE 50 MG
50 TABLET ORAL 2 TIMES DAILY
Qty: 60 TABLET | Refills: 0 | Status: SHIPPED | OUTPATIENT
Start: 2023-07-05 | End: 2023-08-02

## 2023-07-05 RX ORDER — LISINOPRIL 10 MG/1
TABLET ORAL
Qty: 90 TABLET | Refills: 0 | Status: SHIPPED | OUTPATIENT
Start: 2023-07-05 | End: 2023-08-02

## 2023-07-05 NOTE — TELEPHONE ENCOUNTER
"Way overdue for office visit and labs.     Has not been seen in person in an office appointment since January 2020 and not even in a virtual appointment since September 2021.    I sent a one month prescription to her pharmacy, but will need to see her again in the office before I can provide future refills.        Please contact the patient and help schedule the patient for an in person office appointment with me at their convenience within the next month, ideally with a lab appointment shortly before the visit.    OK to use any open same day, next day, or virtual appointment spot to schedule this office visit.   Schedule the \"lab only\" appointment for fasting labs within a week before the appointment if possible, otherwise we can do labs at the time of the appointment.    (if it is too hard for her to come for a lab appointment before the appt, we can just draw her labs at the time she is here so she only has to make one trip)      If the patient declines to schedule a follow up appointment, please make a notation, and let me know.    Close encounter when done.     "

## 2023-07-20 NOTE — TELEPHONE ENCOUNTER
Called patient, Pt stated that she was informed she can not be seen at [facility] because of her [employer] insurance. Pt asked for refill for medications. Advised pt that she will have to contact her new PCP office and ask them for a refill since she has established care with them.

## 2023-08-02 DIAGNOSIS — I25.10 CORONARY ARTERY DISEASE INVOLVING NATIVE CORONARY ARTERY OF NATIVE HEART WITHOUT ANGINA PECTORIS: ICD-10-CM

## 2023-08-02 DIAGNOSIS — I10 ESSENTIAL HYPERTENSION, BENIGN: ICD-10-CM

## 2023-08-02 RX ORDER — LISINOPRIL 10 MG/1
TABLET ORAL
Qty: 90 TABLET | Refills: 0 | Status: CANCELLED | OUTPATIENT
Start: 2023-08-02

## 2023-08-02 RX ORDER — LISINOPRIL 10 MG/1
TABLET ORAL
Qty: 45 TABLET | Refills: 0 | Status: SHIPPED | OUTPATIENT
Start: 2023-08-02 | End: 2023-08-08

## 2023-08-02 RX ORDER — METOPROLOL TARTRATE 50 MG
50 TABLET ORAL 2 TIMES DAILY
Qty: 30 TABLET | Refills: 0 | Status: SHIPPED | OUTPATIENT
Start: 2023-08-02 | End: 2023-08-08

## 2023-08-02 NOTE — TELEPHONE ENCOUNTER
I sent 2 weeks of medication to the pharmacy for her, enough to last her until her appointment with the new provider and clinic as planned on 8/8/23, she should keep that appointment.     She has appt at her new clinic on 8/8/23, they will re-evaluate her medications and provide refills as indicated      FYI:  I have not seen the patient in person since January 2020 and not in a virtual appointment since Sept 2021.     She sent me a letter stating that she was establishing care with a new provider at [facility] who I know will take excellent care of her.       Close encounter when done.

## 2023-08-02 NOTE — TELEPHONE ENCOUNTER
Patient needs only a week of pills because she is waiting to establish care with a new doctor and is running out of meds.

## 2023-08-07 NOTE — PATIENT INSTRUCTIONS
Patient Education   Use Debrox over the counter drops like twice a week to keep the wax from building up.    Wound Care Instructions for Liquid Nitrogen Treatment   The treatment area will appear white at first. Over the next several hours a fluid-filled blister may form. The blister can be very dark. If blister appears, it will remain blistered for about a week. Then a scab will form over the area.   Leave the blistered area uncovered as long as it remains closed. If the area breaks open, you may cover it with a clean band aid. Do not pull off the skin covering the blister.   If the blistered area becomes uncomfortably filled with fluid, you may release some of the fluid by puncturing the blister with a needle that has been cleansed with alcohol.   If the skin covering the blister comes off, clean the area daily with soap and water. Apply a small amount of Vaseline and then cover with a clean band aid. Change the band aid twice daily until the skin is completely healed.   Call us if.....   1. You have signs of infection such as thick yellow or pus-like drainage from the wound site or a fever over 100 degrees Fahrenheit.   2. You have any questions or are not sure how to take care of the wound.      Personalized Prevention Plan  You are due for the preventive services outlined below.  Your care team is available to assist you in scheduling these services.  If you have already completed any of these items, please share that information with your care team to update in your medical record.  Health Maintenance Due   Topic Date Due    Zoster (Shingles) Vaccine (1 of 2) Never done    Diptheria Tetanus Pertussis (DTAP/TDAP/TD) Vaccine (1 - Tdap) 07/27/2006    COVID-19 Vaccine (4 - Pfizer series) 02/07/2022    Complete Blood Count  06/05/2022    Liver Monitoring Lab  11/26/2022    Basic Metabolic Panel  11/26/2022    Cholesterol Lab  11/26/2022    Kidney Microalbumin Urine Test  11/26/2022    Hemoglobin  11/26/2022     PHQ-2 (once per calendar year)  01/01/2023

## 2023-08-07 NOTE — PROGRESS NOTES
"SUBJECTIVE:   Shadia Gil is a 83 year old female who presents for Preventive Visit.       Patient has been advised of split billing requirements and indicates understanding: Yes  Are you in the first 12 months of your Medicare Part B coverage?  No    Physical Health:    In general, how would you rate your overall physical health? good    Outside of work, how many days during the week do you exercise? 1 day/week    Outside of work, approximately how many minutes a day do you exercise?less than 15 minutes    If you drink alcohol do you typically have >3 drinks per day or >7 drinks per week? No    Do you usually eat at least 4 servings of fruit and vegetables a day, include whole grains & fiber and avoid regularly eating high fat or \"junk\" foods? Yes    Do you have any problems taking medications regularly?  No    Do you have any side effects from medications? none    Needs assistance for the following daily activities: transportation    Which of the following safety concerns are present in your home?  none identified     Hearing impairment: No    In the past 6 months, have you been bothered by leaking of urine? no    Mental Health:    In general, how would you rate your overall mental or emotional health? excellent    PHQ-2 Score:      Do you feel safe in your environment? Yes    Have you ever done Advance Care Planning? (For example, a Health Directive, POLST, or a discussion with a medical provider or your loved ones about your wishes): No, advance care planning information given to patient to review.  Advanced care planning was discussed at today's visit.    Additional concerns to address?  No    Fall risk:  Fallen 2 or more times in the past year?: No  Any fall with injury in the past year?: No    Cognitive Screenin) Repeat 3 items (Leader, Season, Table)    2) Clock draw: NORMAL  3) 3 item recall: Recalls 2 objects   Results: NORMAL clock, 1-2 items recalled: COGNITIVE IMPAIRMENT LESS " LIKELY    Mini-CogTM Copyright DEEP Parada. Licensed by the author for use in [facility]; reprinted with permission (faye@.[facility]). All rights reserved.      Do you have sleep apnea, excessive snoring or daytime drowsiness?: no        PROBLEMS TO ADD ON...       Reviewed and updated as needed this visit by clinical staff    Allergies  Meds  Problems             Reviewed and updated as needed this visit by Provider    Allergies   Problems            Social History     Tobacco Use     Smoking status: Never     Smokeless tobacco: Never   Substance Use Topics     Alcohol use: No     Alcohol/week: 0.0 standard drinks of alcohol              No data to display              Do you have a current opioid prescription? No  Do you use any other controlled substances or medications that are not prescribed by a provider? None                      Current providers sharing in care for this patient include:   Patient Care Team:  Salvador Jose MD as PCP - General (Family Medicine)  Adryan Martinez MD as Assigned PCP    The following health maintenance items are reviewed in Epic and correct as of today:  Health Maintenance   Topic Date Due     ZOSTER IMMUNIZATION (1 of 2) Never done     DTAP/TDAP/TD IMMUNIZATION (1 - Tdap) 07/27/2006     COVID-19 Vaccine (4 - Pfizer series) 02/07/2022     CBC  06/05/2022     ALT  11/26/2022     BMP  11/26/2022     LIPID  11/26/2022     MICROALBUMIN  11/26/2022     HEMOGLOBIN  11/26/2022     PHQ-2 (once per calendar year)  01/01/2023     INFLUENZA VACCINE (1) 09/01/2023     MEDICARE ANNUAL WELLNESS VISIT  08/08/2024     FALL RISK ASSESSMENT  08/08/2024     ADVANCE CARE PLANNING  08/08/2028     Pneumococcal Vaccine: 65+ Years  Completed     URINALYSIS  Completed     IPV IMMUNIZATION  Aged Out     MENINGITIS IMMUNIZATION  Aged Out     Lab work is in process       ROS:  Constitutional, HEENT, cardiovascular, pulmonary, GI, , musculoskeletal, neuro, skin, endocrine and  "psych systems are negative, except as otherwise noted.    OBJECTIVE:   BP (!) 81/61   Pulse 60   Ht 1.613 m (5' 3.5\")   Wt 103.5 kg (228 lb 3.2 oz)   SpO2 97%   BMI 39.79 kg/m   Estimated body mass index is 39.79 kg/m  as calculated from the following:    Height as of this encounter: 1.613 m (5' 3.5\").    Weight as of this encounter: 103.5 kg (228 lb 3.2 oz).  EXAM:   GENERAL APPEARANCE: healthy, alert and no distress  EYES: Eyes grossly normal to inspection, PERRL and conjunctivae and sclerae normal  HENT: nose and mouth without ulcers or lesions, oropharynx clear, oral mucous membranes moist and both ears: occluded with wax  NECK: no adenopathy, no asymmetry, masses, or scars and thyroid normal to palpation  RESP: lungs clear to auscultation - no rales, rhonchi or wheezes  BREAST: normal without masses, tenderness or nipple discharge and no palpable axillary masses or adenopathy  CV: regular rate and rhythm, normal S1 S2, no S3 or S4, no murmur, click or rub, no peripheral edema and peripheral pulses strong  ABDOMEN: soft, nontender, no hepatosplenomegaly, no masses and bowel sounds normal  MS: no musculoskeletal defects are noted and gait is age appropriate without ataxia  SKIN: no suspicious lesions or rashes  NEURO: Normal strength and tone, sensory exam grossly normal, mentation intact and speech normal  PSYCH: mentation appears normal and affect normal/bright    Diagnostic Test Results:  Labs reviewed in Epic    ASSESSMENT / PLAN:   Shadia was seen today for consult and recheck medication.    Diagnoses and all orders for this visit:    Encounter for Medicare annual wellness exam    Essential hypertension, benign  Reviewed her current HTN management. Discussed our goal for her is a systolic pressure at or below 128 and diastolic pressure at or below 83.  We today managed her prescriptions with refills ensured to ensure availabilty of current medications.  Discussed the importance for aggressive " management of HTN to prevent vascular complications later.  Recommended lower fat, lower carbohydrate, and lower sodium (<2000 mg)diet. Required intervals for follow up on HTN, lab studies reviewed.    Strongly recommened she follow her blood pressures outside the clinic to ensure that BPs are remaining within guidelines,.  Instructed to contact me if the readings are not within guidelines on a regular basis so we can adjust treatment as needed.    -     Comprehensive metabolic panel; Future  -     Discontinue: lisinopril (ZESTRIL) 10 MG tablet; Take 2 tablets (20 mg) by mouth every morning AND 1 tablet (10 mg) every evening. LAST REFILL WITHOUT A FOLLOW UP APPOINTMENT  -     Discontinue: metoprolol tartrate (LOPRESSOR) 50 MG tablet; Take 1 tablet (50 mg) by mouth 2 times daily LAST REFILL WITHOUT A FOLLOW UP APPOINTMENT  -     lisinopril (ZESTRIL) 10 MG tablet; Take 2 tablets (20 mg) by mouth every morning AND 1 tablet (10 mg) every evening. 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  Check urine for protein.   Patient is on ACE/ARB.  Patient is on a statin.  Told the patient to avoid NSAIDs if at all possible.   Will monitor closely and send to Nephrologist if renal function continues to decline.      -     Microalbumin (R[facility])  -     CBC with Diff/Plt (RMG)    Tachycardia-bradycardia (H)  Doing well with pacer    Severe obesity (BMI 35.0-35.9 with comorbidity) (H)  Current BMI is: Body mass index is 39.79 kg/m ..  Reviewed weight patterns.   Obesity as a biological preventable and treatable disease, which is associated with significantly increased  "risk of many acute and chronic health conditions.   Obesity has now been recognized as a chronic disease by the American Medical Association.  Obesity has serious co-morbidities associated with obesity including increased risk for hypertension, stroke, coronary artery disease, dyslipidemia, Type II diabetes, depression, sleep apnea, cancers of the colon, breast and endometrium, obstructive sleep apnea, osteoarthritis and female infertility.  Recommended regular aerobic exercise, recommended improved diet aiming at lowering amount of processed foods, lower sugars, moderation/reduction of simple carbs and fat in the diet, establishing more regular meal times, always eating breakfast, front loading some of the calories and adding more protein to the diet.             Patient has been advised of split billing requirements and indicates understanding: Yes    COUNSELING:  Reviewed preventive health counseling, as reflected in patient instructions       Regular exercise       Healthy diet/nutrition    Estimated body mass index is 39.79 kg/m  as calculated from the following:    Height as of this encounter: 1.613 m (5' 3.5\").    Weight as of this encounter: 103.5 kg (228 lb 3.2 oz).    Weight management plan: Discussed healthy diet and exercise guidelines    She reports that she has never smoked. She has never used smokeless tobacco.      I have reviewed Opioid Use Disorder and Substance Use Disorder risk factors and made any needed referrals.     Appropriate preventive services were discussed with this patient, including applicable screening as appropriate for cardiovascular disease, diabetes, osteopenia/osteoporosis, and glaucoma.  As appropriate for age/gender, discussed screening for colorectal cancer, prostate cancer, breast cancer, and cervical cancer. Checklist reviewing preventive services available has been given to the patient.    Reviewed patients plan of care and provided an AVS. The Basic Care Plan (routine " screening as documented in Health Maintenance) for Shadia meets the Care Plan requirement. This Care Plan has been established and reviewed with the Patient.    Counseling Resources:  ATP IV Guidelines  Pooled Cohorts Equation Calculator  Breast Cancer Risk Calculator  BRCA-Related Cancer Risk Assessment: FHS-7 Tool  FRAX Risk Assessment  ICSI Preventive Guidelines  Dietary Guidelines for Americans, 2010  USDA's MyPlate  ASA Prophylaxis  Lung CA Screening    Salvador Jose MD  [facility]

## 2023-08-08 ENCOUNTER — OFFICE VISIT (OUTPATIENT)
Dept: FAMILY MEDICINE | Facility: CLINIC | Age: 83
End: 2023-08-08

## 2023-08-08 VITALS
BODY MASS INDEX: 38.96 KG/M2 | OXYGEN SATURATION: 97 % | WEIGHT: 228.2 LBS | DIASTOLIC BLOOD PRESSURE: 61 MMHG | HEART RATE: 60 BPM | SYSTOLIC BLOOD PRESSURE: 81 MMHG | HEIGHT: 64 IN

## 2023-08-08 DIAGNOSIS — I49.5 TACHYCARDIA-BRADYCARDIA (H): ICD-10-CM

## 2023-08-08 DIAGNOSIS — E66.01 SEVERE OBESITY (BMI 35.0-35.9 WITH COMORBIDITY) (H): ICD-10-CM

## 2023-08-08 DIAGNOSIS — N18.32 STAGE 3B CHRONIC KIDNEY DISEASE (H): ICD-10-CM

## 2023-08-08 DIAGNOSIS — L82.0 INFLAMED SEBORRHEIC KERATOSIS: ICD-10-CM

## 2023-08-08 DIAGNOSIS — Z00.00 ENCOUNTER FOR MEDICARE ANNUAL WELLNESS EXAM: Primary | ICD-10-CM

## 2023-08-08 DIAGNOSIS — I25.750 CORONARY ARTERY DISEASE INVOLVING NATIVE ARTERY OF TRANSPLANTED HEART WITH UNSTABLE ANGINA PECTORIS (H): ICD-10-CM

## 2023-08-08 DIAGNOSIS — I10 ESSENTIAL HYPERTENSION, BENIGN: ICD-10-CM

## 2023-08-08 LAB
% GRANULOCYTES: 78.6 % (ref 42.2–75.2)
ALBUMIN SERPL BCG-MCNC: 4.2 G/DL (ref 3.5–5.2)
ALP SERPL-CCNC: 63 U/L (ref 35–104)
ALT SERPL W P-5'-P-CCNC: 12 U/L (ref 0–50)
ANION GAP SERPL CALCULATED.3IONS-SCNC: 13 MMOL/L (ref 7–15)
AST SERPL W P-5'-P-CCNC: 17 U/L (ref 0–45)
BILIRUB SERPL-MCNC: 0.6 MG/DL
BUN SERPL-MCNC: 20 MG/DL (ref 8–23)
CALCIUM SERPL-MCNC: 9.4 MG/DL (ref 8.8–10.2)
CHLORIDE SERPL-SCNC: 103 MMOL/L (ref 98–107)
CHOLESTEROL: 176 MG/DL (ref 100–199)
CREAT SERPL-MCNC: 1.19 MG/DL (ref 0.51–0.95)
DEPRECATED HCO3 PLAS-SCNC: 25 MMOL/L (ref 22–29)
FASTING?: YES
GFR SERPL CREATININE-BSD FRML MDRD: 45 ML/MIN/1.73M2
GLUCOSE SERPL-MCNC: 85 MG/DL (ref 70–99)
HCT VFR BLD AUTO: 43.4 % (ref 35–46)
HDL (RMG): 52 MG/DL (ref 40–?)
HEMOGLOBIN: 14.1 G/DL (ref 11.8–15.5)
LDL CALCULATED (RMG): 97 MG/DL (ref 0–130)
LYMPHOCYTES NFR BLD AUTO: 16 % (ref 20.5–51.1)
MCH RBC QN AUTO: 28.8 PG (ref 27–31)
MCHC RBC AUTO-ENTMCNC: 32.6 G/DL (ref 33–37)
MCV RBC AUTO: 88.5 FL (ref 80–100)
MONOCYTES NFR BLD AUTO: 5.4 % (ref 1.7–9.3)
PLATELET # BLD AUTO: 225 K/UL (ref 140–450)
POTASSIUM SERPL-SCNC: 5 MMOL/L (ref 3.4–5.3)
PROT SERPL-MCNC: 7.1 G/DL (ref 6.4–8.3)
RBC # BLD AUTO: 4.9 X10/CMM (ref 3.7–5.2)
SODIUM SERPL-SCNC: 141 MMOL/L (ref 136–145)
TRIGLYCERIDES (RMG): 136 MG/DL (ref 0–149)
WBC # BLD AUTO: 9 X10/CMM (ref 3.8–11)

## 2023-08-08 PROCEDURE — G0439 PPPS, SUBSEQ VISIT: HCPCS | Performed by: FAMILY MEDICINE

## 2023-08-08 PROCEDURE — 36415 COLL VENOUS BLD VENIPUNCTURE: CPT | Performed by: FAMILY MEDICINE

## 2023-08-08 PROCEDURE — 17110 DESTRUCTION B9 LES UP TO 14: CPT | Mod: 59 | Performed by: FAMILY MEDICINE

## 2023-08-08 PROCEDURE — 80053 COMPREHEN METABOLIC PANEL: CPT | Mod: ORL | Performed by: FAMILY MEDICINE

## 2023-08-08 PROCEDURE — 99214 OFFICE O/P EST MOD 30 MIN: CPT | Mod: 25 | Performed by: FAMILY MEDICINE

## 2023-08-08 PROCEDURE — 82044 UR ALBUMIN SEMIQUANTITATIVE: CPT | Performed by: FAMILY MEDICINE

## 2023-08-08 PROCEDURE — 85025 COMPLETE CBC W/AUTO DIFF WBC: CPT | Performed by: FAMILY MEDICINE

## 2023-08-08 PROCEDURE — 80061 LIPID PANEL: CPT | Mod: QW | Performed by: FAMILY MEDICINE

## 2023-08-08 RX ORDER — METOPROLOL TARTRATE 50 MG
50 TABLET ORAL 2 TIMES DAILY
Qty: 30 TABLET | Refills: 0 | Status: SHIPPED | OUTPATIENT
Start: 2023-08-08 | End: 2023-08-08

## 2023-08-08 RX ORDER — LISINOPRIL 10 MG/1
TABLET ORAL
Qty: 45 TABLET | Refills: 0 | Status: SHIPPED | OUTPATIENT
Start: 2023-08-08 | End: 2023-08-08

## 2023-08-08 RX ORDER — METOPROLOL TARTRATE 50 MG
50 TABLET ORAL 2 TIMES DAILY
Qty: 30 TABLET | Refills: 11 | Status: SHIPPED | OUTPATIENT
Start: 2023-08-08 | End: 2024-02-14

## 2023-08-08 RX ORDER — LISINOPRIL 10 MG/1
TABLET ORAL
Qty: 45 TABLET | Refills: 11 | Status: SHIPPED | OUTPATIENT
Start: 2023-08-08 | End: 2024-02-14

## 2023-08-08 ASSESSMENT — ACTIVITIES OF DAILY LIVING (ADL): CURRENT_FUNCTION: NEEDS ASSISTANCE

## 2023-08-08 NOTE — PROCEDURES
Seb k on the back.  Red itchy, irritating, catch\es on clothes    ROS: No bleeding problems.    cryotherapy applied  Liquid nitrogen was applied for 5-10 seconds x3  to the skin lesions      A:P  Irritated, red and itchy  Rafat K's    The expected blistering or scabbing reaction explained. Do not pick at the areas. Patient reminded to expect hypopigmented scars from the procedure. Return if lesions fail to fully resolve.

## 2023-08-08 NOTE — LETTER
August 14, 2023      Shadia Gil  [id number]    [place] [address]    Dear Shadia,     I am writing to report that your included test results are as expected.    Many labs contain some results that are slightly outside of the normal range, I have reviewed any of these results and they require no changes at this time.     Salvador Jose MD       Resulted Orders   Microalbumin (RMG)   Result Value Ref Range    Albumin mg/L 10 10 - 30    Urine Creatinine Mg/Dl 50 0 - 300    A/C Ratio mg/g  (A)     Interpretation abnormal    CBC with Diff/Plt (RMG)   Result Value Ref Range    WBC x10/cmm 9.0 3.8 - 11.0 x10/cmm    % Lymphocytes 16.0 (A) 20.5 - 51.1 %    % Monocytes 5.4 1.7 - 9.3 %    % Granulocytes 78.6 (A) 42.2 - 75.2 %    RBC x10/cmm 4.90 3.7 - 5.2 x10/cmm    Hemoglobin 14.1 11.8 - 15.5 g/dl    Hematocrit 43.4 35 - 46 %    MCV 88.5 80 - 100 fL    MCH 28.8 27.0 - 31.0 pg    MCHC 32.6 (A) 33.0 - 37.0 g/dL    Platelet Count 225 140 - 450 K/uL   Lipid Profile (RMG)   Result Value Ref Range    Cholesterol 176 100 - 199 mg/dL    HDL 52 40 mg/dL    Triglycerides 136 0 - 149 mg/dL    LDL CALCULATED (RMG) 97 0 - 130 mg/dL    Patient Fasting? Yes    Comprehensive metabolic panel   Result Value Ref Range    Sodium 141 136 - 145 mmol/L    Potassium 5.0 3.4 - 5.3 mmol/L    Chloride 103 98 - 107 mmol/L    Carbon Dioxide (CO2) 25 22 - 29 mmol/L    Anion Gap 13 7 - 15 mmol/L    Urea Nitrogen 20.0 8.0 - 23.0 mg/dL    Creatinine 1.19 (H) 0.51 - 0.95 mg/dL    Calcium 9.4 8.8 - 10.2 mg/dL    Glucose 85 70 - 99 mg/dL    Alkaline Phosphatase 63 35 - 104 U/L    AST 17 0 - 45 U/L      Comment:      Reference intervals for this test were updated on 6/12/2023 to more accurately reflect our healthy population. There may be differences in the flagging of prior results with similar values performed with this method. Interpretation of those prior results can be made in the context of the updated reference  intervals.    ALT 12 0 - 50 U/L      Comment:      Reference intervals for this test were updated on 6/12/2023 to more accurately reflect our healthy population. There may be differences in the flagging of prior results with similar values performed with this method. Interpretation of those prior results can be made in the context of the updated reference intervals.      Protein Total 7.1 6.4 - 8.3 g/dL    Albumin 4.2 3.5 - 5.2 g/dL    Bilirubin Total 0.6 <=1.2 mg/dL    GFR Estimate 45 (L) >60 mL/min/1.73m2       If you have any questions or concerns, please call the clinic at the number listed above.       Sincerely,      Salvador Jose MD

## 2023-08-09 LAB
A/C RATIO (RMG): ABNORMAL
ALBUMIN- RMG: 10 (ref 10–30)
INTERPRETATION: ABNORMAL
URINE CREATININE - RMG: 50 (ref 0–300)

## 2023-08-11 NOTE — RESULT ENCOUNTER NOTE
Dear Shadia,     I am writing to report that your included test results are as expected.    Many labs contain some results that are slightly outside of the normal range, I have reviewed any of these results and they require no changes at this time.    Salvador Jose MD

## 2023-11-09 ENCOUNTER — ANCILLARY PROCEDURE (OUTPATIENT)
Dept: CARDIOLOGY | Facility: CLINIC | Age: 83
End: 2023-11-09
Attending: INTERNAL MEDICINE
Payer: COMMERCIAL

## 2023-11-09 DIAGNOSIS — Z95.0 CARDIAC PACEMAKER IN SITU: Primary | ICD-10-CM

## 2023-11-09 DIAGNOSIS — I49.5 SICK SINUS SYNDROME (H): ICD-10-CM

## 2023-11-09 DIAGNOSIS — Z95.0 CARDIAC PACEMAKER IN SITU: ICD-10-CM

## 2023-11-09 PROCEDURE — 93296 REM INTERROG EVL PM/IDS: CPT | Performed by: INTERNAL MEDICINE

## 2023-11-09 PROCEDURE — 93294 REM INTERROG EVL PM/LDLS PM: CPT | Performed by: INTERNAL MEDICINE

## 2023-11-10 LAB
MDC_IDC_LEAD_CONNECTION_STATUS: NORMAL
MDC_IDC_LEAD_CONNECTION_STATUS: NORMAL
MDC_IDC_LEAD_IMPLANT_DT: NORMAL
MDC_IDC_LEAD_IMPLANT_DT: NORMAL
MDC_IDC_LEAD_LOCATION: NORMAL
MDC_IDC_LEAD_LOCATION: NORMAL
MDC_IDC_LEAD_LOCATION_DETAIL_1: NORMAL
MDC_IDC_LEAD_LOCATION_DETAIL_1: NORMAL
MDC_IDC_LEAD_MFG: NORMAL
MDC_IDC_LEAD_MFG: NORMAL
MDC_IDC_LEAD_MODEL: NORMAL
MDC_IDC_LEAD_MODEL: NORMAL
MDC_IDC_LEAD_POLARITY_TYPE: NORMAL
MDC_IDC_LEAD_POLARITY_TYPE: NORMAL
MDC_IDC_LEAD_SERIAL: NORMAL
MDC_IDC_LEAD_SERIAL: NORMAL
MDC_IDC_MSMT_BATTERY_DTM: NORMAL
MDC_IDC_MSMT_BATTERY_IMPEDANCE: 1353 OHM
MDC_IDC_MSMT_BATTERY_REMAINING_LONGEVITY: 57 MO
MDC_IDC_MSMT_BATTERY_STATUS: NORMAL
MDC_IDC_MSMT_BATTERY_VOLTAGE: 2.77 V
MDC_IDC_MSMT_LEADCHNL_RA_IMPEDANCE_VALUE: 409 OHM
MDC_IDC_MSMT_LEADCHNL_RA_PACING_THRESHOLD_AMPLITUDE: 0.5 V
MDC_IDC_MSMT_LEADCHNL_RA_PACING_THRESHOLD_PULSEWIDTH: 0.4 MS
MDC_IDC_MSMT_LEADCHNL_RV_IMPEDANCE_VALUE: 404 OHM
MDC_IDC_MSMT_LEADCHNL_RV_PACING_THRESHOLD_AMPLITUDE: 0.5 V
MDC_IDC_MSMT_LEADCHNL_RV_PACING_THRESHOLD_PULSEWIDTH: 0.4 MS
MDC_IDC_PG_IMPLANT_DTM: NORMAL
MDC_IDC_PG_MFG: NORMAL
MDC_IDC_PG_MODEL: NORMAL
MDC_IDC_PG_SERIAL: NORMAL
MDC_IDC_PG_TYPE: NORMAL
MDC_IDC_SESS_CLINIC_NAME: NORMAL
MDC_IDC_SESS_DTM: NORMAL
MDC_IDC_SESS_TYPE: NORMAL
MDC_IDC_SET_BRADY_AT_MODE_SWITCH_MODE: NORMAL
MDC_IDC_SET_BRADY_AT_MODE_SWITCH_RATE: 175 {BEATS}/MIN
MDC_IDC_SET_BRADY_LOWRATE: 60 {BEATS}/MIN
MDC_IDC_SET_BRADY_MAX_SENSOR_RATE: 130 {BEATS}/MIN
MDC_IDC_SET_BRADY_MAX_TRACKING_RATE: 130 {BEATS}/MIN
MDC_IDC_SET_BRADY_MODE: NORMAL
MDC_IDC_SET_BRADY_PAV_DELAY_LOW: 150 MS
MDC_IDC_SET_BRADY_SAV_DELAY_LOW: 120 MS
MDC_IDC_SET_LEADCHNL_RA_PACING_AMPLITUDE: 1.5 V
MDC_IDC_SET_LEADCHNL_RA_PACING_CAPTURE_MODE: NORMAL
MDC_IDC_SET_LEADCHNL_RA_PACING_POLARITY: NORMAL
MDC_IDC_SET_LEADCHNL_RA_PACING_PULSEWIDTH: 0.4 MS
MDC_IDC_SET_LEADCHNL_RA_SENSING_POLARITY: NORMAL
MDC_IDC_SET_LEADCHNL_RA_SENSING_SENSITIVITY: 0.5 MV
MDC_IDC_SET_LEADCHNL_RV_PACING_AMPLITUDE: 2 V
MDC_IDC_SET_LEADCHNL_RV_PACING_CAPTURE_MODE: NORMAL
MDC_IDC_SET_LEADCHNL_RV_PACING_POLARITY: NORMAL
MDC_IDC_SET_LEADCHNL_RV_PACING_PULSEWIDTH: 0.4 MS
MDC_IDC_SET_LEADCHNL_RV_SENSING_POLARITY: NORMAL
MDC_IDC_SET_LEADCHNL_RV_SENSING_SENSITIVITY: 2 MV
MDC_IDC_SET_ZONE_DETECTION_INTERVAL: 333.33 MS
MDC_IDC_SET_ZONE_DETECTION_INTERVAL: 342.86 MS
MDC_IDC_SET_ZONE_STATUS: NORMAL
MDC_IDC_SET_ZONE_STATUS: NORMAL
MDC_IDC_SET_ZONE_TYPE: NORMAL
MDC_IDC_SET_ZONE_TYPE: NORMAL
MDC_IDC_SET_ZONE_VENDOR_TYPE: NORMAL
MDC_IDC_SET_ZONE_VENDOR_TYPE: NORMAL
MDC_IDC_STAT_AT_BURDEN_PERCENT: 0 %
MDC_IDC_STAT_AT_DTM_END: NORMAL
MDC_IDC_STAT_AT_DTM_START: NORMAL
MDC_IDC_STAT_AT_MODE_SW_COUNT: 30
MDC_IDC_STAT_BRADY_AP_VP_PERCENT: 0 %
MDC_IDC_STAT_BRADY_AP_VS_PERCENT: 93 %
MDC_IDC_STAT_BRADY_AS_VP_PERCENT: 0 %
MDC_IDC_STAT_BRADY_AS_VS_PERCENT: 7 %
MDC_IDC_STAT_BRADY_DTM_END: NORMAL
MDC_IDC_STAT_BRADY_DTM_START: NORMAL
MDC_IDC_STAT_EPISODE_RECENT_COUNT: 0
MDC_IDC_STAT_EPISODE_RECENT_COUNT: 0
MDC_IDC_STAT_EPISODE_RECENT_COUNT_DTM_END: NORMAL
MDC_IDC_STAT_EPISODE_RECENT_COUNT_DTM_END: NORMAL
MDC_IDC_STAT_EPISODE_RECENT_COUNT_DTM_START: NORMAL
MDC_IDC_STAT_EPISODE_RECENT_COUNT_DTM_START: NORMAL
MDC_IDC_STAT_EPISODE_TYPE: NORMAL
MDC_IDC_STAT_EPISODE_TYPE: NORMAL

## 2024-02-14 DIAGNOSIS — I10 ESSENTIAL HYPERTENSION, BENIGN: ICD-10-CM

## 2024-02-14 RX ORDER — LISINOPRIL 10 MG/1
TABLET ORAL
Qty: 45 TABLET | Refills: 11 | Status: SHIPPED | OUTPATIENT
Start: 2024-02-14 | End: 2024-08-13

## 2024-02-14 RX ORDER — METOPROLOL TARTRATE 50 MG
50 TABLET ORAL 2 TIMES DAILY
Qty: 30 TABLET | Refills: 11 | Status: SHIPPED | OUTPATIENT
Start: 2024-02-14 | End: 2024-08-12

## 2024-02-14 NOTE — TELEPHONE ENCOUNTER
Med: Metoprolol       LOV (related): 8/8/23    Last Lab: 8/8/23      Due for F/U around: 6 months 2/8/24    Next Appt: None           BP Readings from Last 3 Encounters:   08/08/23 (!) 81/61   02/25/20 138/78   01/24/20 136/84       Last Comprehensive Metabolic Panel:  Lab Results   Component Value Date     08/08/2023    POTASSIUM 5.0 08/08/2023    CHLORIDE 103 08/08/2023    CO2 25 08/08/2023    ANIONGAP 13 08/08/2023    GLC 85 08/08/2023    BUN 20.0 08/08/2023    CR 1.19 (H) 08/08/2023    GFRESTIMATED 45 (L) 08/08/2023    MILTON 9.4 08/08/2023

## 2024-03-14 DIAGNOSIS — I25.10 CORONARY ARTERY DISEASE INVOLVING NATIVE CORONARY ARTERY OF NATIVE HEART WITHOUT ANGINA PECTORIS: ICD-10-CM

## 2024-03-14 DIAGNOSIS — E78.5 HYPERLIPIDEMIA LDL GOAL <100: ICD-10-CM

## 2024-03-15 RX ORDER — PRAVASTATIN SODIUM 80 MG/1
80 TABLET ORAL DAILY
Qty: 90 TABLET | Refills: 2 | Status: SHIPPED | OUTPATIENT
Start: 2024-03-15

## 2024-03-15 NOTE — TELEPHONE ENCOUNTER
Med:  Pravastatin      LOV (related): 8/8/23    Last Lab: 8/8/23      Due for F/U around: 1 year      Next Appt: None           Cholesterol   Date Value Ref Range Status   08/08/2023 176 100 - 199 mg/dL Final   11/26/2021 172 <200 mg/dL Final   06/05/2021 196 <200 mg/dL Final   01/24/2020 182 <200 mg/dL Final     HDL Cholesterol   Date Value Ref Range Status   06/05/2021 56 >49 mg/dL Final   01/24/2020 53 >49 mg/dL Final     HDL   Date Value Ref Range Status   08/08/2023 52 40 mg/dL Final     Direct Measure HDL   Date Value Ref Range Status   11/26/2021 53 >=50 mg/dL Final     LDL Cholesterol Calculated   Date Value Ref Range Status   11/26/2021 78 <=100 mg/dL Final   06/05/2021 102 (H) <100 mg/dL Final     Comment:     Above desirable:  100-129 mg/dl  Borderline High:  130-159 mg/dL  High:             160-189 mg/dL  Very high:       >189 mg/dl     01/24/2020 96 <100 mg/dL Final     Comment:     Desirable:       <100 mg/dl     LDL CALCULATED (RMG)   Date Value Ref Range Status   08/08/2023 97 0 - 130 mg/dL Final     Triglycerides   Date Value Ref Range Status   08/08/2023 136 0 - 149 mg/dL Final   11/26/2021 205 (H) <150 mg/dL Final   06/05/2021 188 (H) <150 mg/dL Final     Comment:     Borderline high:  150-199 mg/dl  High:             200-499 mg/dl  Very high:       >499 mg/dl     01/24/2020 164 (H) <150 mg/dL Final     Comment:     Borderline high:  150-199 mg/dl  High:             200-499 mg/dl  Very high:       >499 mg/dl       Cholesterol/HDL Ratio   Date Value Ref Range Status   09/30/2015 2.9 0.0 - 5.0 Final   09/16/2014 3.4 0.0 - 5.0 Final

## 2024-03-18 RX ORDER — PRAVASTATIN SODIUM 80 MG/1
TABLET ORAL
Qty: 90 TABLET | Refills: 2 | Status: SHIPPED | OUTPATIENT
Start: 2024-03-18

## 2024-07-12 DIAGNOSIS — I25.10 CORONARY ARTERY DISEASE INVOLVING NATIVE CORONARY ARTERY OF NATIVE HEART WITHOUT ANGINA PECTORIS: ICD-10-CM

## 2024-07-12 DIAGNOSIS — E78.5 HYPERLIPIDEMIA LDL GOAL <100: ICD-10-CM

## 2024-07-12 RX ORDER — PRAVASTATIN SODIUM 80 MG/1
TABLET ORAL
Qty: 90 TABLET | Refills: 2 | OUTPATIENT
Start: 2024-07-12

## 2024-08-12 DIAGNOSIS — I10 ESSENTIAL HYPERTENSION, BENIGN: ICD-10-CM

## 2024-08-12 RX ORDER — METOPROLOL TARTRATE 50 MG
50 TABLET ORAL 2 TIMES DAILY
Qty: 30 TABLET | Refills: 11 | Status: SHIPPED | OUTPATIENT
Start: 2024-08-12

## 2024-08-13 DIAGNOSIS — I10 ESSENTIAL HYPERTENSION, BENIGN: ICD-10-CM

## 2024-08-13 NOTE — TELEPHONE ENCOUNTER
Med: Lisinopril      LOV (related): 8/8/23-cpx     Last Lab: 8/8/23      Due for F/U around:  due for CPX      Next Appt: No current future appointments scheduled         BP Readings from Last 3 Encounters:   08/08/23 (!) 81/61   02/25/20 138/78   01/24/20 136/84       Last Comprehensive Metabolic Panel:  Lab Results   Component Value Date     08/08/2023    POTASSIUM 5.0 08/08/2023    CHLORIDE 103 08/08/2023    CO2 25 08/08/2023    ANIONGAP 13 08/08/2023    GLC 85 08/08/2023    BUN 20.0 08/08/2023    CR 1.19 (H) 08/08/2023    GFRESTIMATED 45 (L) 08/08/2023    MILTON 9.4 08/08/2023

## 2024-08-14 RX ORDER — LISINOPRIL 10 MG/1
TABLET ORAL
Qty: 45 TABLET | Refills: 11 | Status: SHIPPED | OUTPATIENT
Start: 2024-08-14 | End: 2024-08-16

## 2024-08-16 DIAGNOSIS — I10 ESSENTIAL HYPERTENSION, BENIGN: ICD-10-CM

## 2024-08-16 NOTE — TELEPHONE ENCOUNTER
Med: lisinopril    LOV (related): 08/08/2023    Last Lab: 08/08/2023      Due for F/U around: Now! Patient was told no more refills until she is seen in clinic    Next Appt: Not scheduled        BP Readings from Last 3 Encounters:   08/08/23 (!) 81/61   02/25/20 138/78   01/24/20 136/84       Last Comprehensive Metabolic Panel:  Lab Results   Component Value Date     08/08/2023    POTASSIUM 5.0 08/08/2023    CHLORIDE 103 08/08/2023    CO2 25 08/08/2023    ANIONGAP 13 08/08/2023    GLC 85 08/08/2023    BUN 20.0 08/08/2023    CR 1.19 (H) 08/08/2023    GFRESTIMATED 45 (L) 08/08/2023    MILTON 9.4 08/08/2023

## 2024-08-18 RX ORDER — LISINOPRIL 10 MG/1
TABLET ORAL
Qty: 45 TABLET | Refills: 0 | Status: SHIPPED | OUTPATIENT
Start: 2024-08-18 | End: 2024-08-28

## 2024-08-28 ENCOUNTER — OFFICE VISIT (OUTPATIENT)
Dept: FAMILY MEDICINE | Facility: CLINIC | Age: 84
End: 2024-08-28

## 2024-08-28 VITALS
BODY MASS INDEX: 39.4 KG/M2 | HEART RATE: 60 BPM | DIASTOLIC BLOOD PRESSURE: 81 MMHG | HEIGHT: 64 IN | SYSTOLIC BLOOD PRESSURE: 168 MMHG | OXYGEN SATURATION: 97 % | WEIGHT: 230.8 LBS

## 2024-08-28 DIAGNOSIS — I25.750 CORONARY ARTERY DISEASE INVOLVING NATIVE ARTERY OF TRANSPLANTED HEART WITH UNSTABLE ANGINA PECTORIS (H): ICD-10-CM

## 2024-08-28 DIAGNOSIS — I70.0 ATHEROSCLEROSIS OF AORTA (H): Primary | ICD-10-CM

## 2024-08-28 DIAGNOSIS — I49.5 TACHYCARDIA-BRADYCARDIA (H): ICD-10-CM

## 2024-08-28 DIAGNOSIS — I10 ESSENTIAL HYPERTENSION, BENIGN: ICD-10-CM

## 2024-08-28 DIAGNOSIS — N25.81 SECONDARY HYPERPARATHYROIDISM OF RENAL ORIGIN (H): ICD-10-CM

## 2024-08-28 DIAGNOSIS — N18.32 STAGE 3B CHRONIC KIDNEY DISEASE (H): ICD-10-CM

## 2024-08-28 DIAGNOSIS — E66.01 SEVERE OBESITY (BMI 35.0-35.9 WITH COMORBIDITY) (H): ICD-10-CM

## 2024-08-28 LAB
% GRANULOCYTES: 76.9 % (ref 42.2–75.2)
A/C RATIO (RMG): ABNORMAL
ALBUMIN SERPL BCG-MCNC: 4.2 G/DL (ref 3.5–5.2)
ALBUMIN- RMG: 30 (ref 0–10)
ALP SERPL-CCNC: 68 U/L (ref 40–150)
ALT SERPL W P-5'-P-CCNC: 15 U/L (ref 0–50)
ANION GAP SERPL CALCULATED.3IONS-SCNC: 13 MMOL/L (ref 7–15)
AST SERPL W P-5'-P-CCNC: 23 U/L (ref 0–45)
BILIRUB SERPL-MCNC: 0.5 MG/DL
BUN SERPL-MCNC: 22.4 MG/DL (ref 8–23)
CALCIUM SERPL-MCNC: 9.4 MG/DL (ref 8.8–10.4)
CHLORIDE SERPL-SCNC: 103 MMOL/L (ref 98–107)
CHOLESTEROL: 163 MG/DL (ref 100–199)
CREAT SERPL-MCNC: 1.08 MG/DL (ref 0.51–0.95)
EGFRCR SERPLBLD CKD-EPI 2021: 50 ML/MIN/1.73M2
FASTING STATUS PATIENT QL REPORTED: ABNORMAL
FASTING?: NO
GLUCOSE SERPL-MCNC: 87 MG/DL (ref 70–99)
HCO3 SERPL-SCNC: 24 MMOL/L (ref 22–29)
HCT VFR BLD AUTO: 42.3 % (ref 35–46)
HDL (RMG): 56 MG/DL (ref 40–?)
HEMOGLOBIN: 13.8 G/DL (ref 11.8–15.5)
INTERPRETATION: ABNORMAL
LDL CALCULATED (RMG): 79 MG/DL (ref 0–130)
LYMPHOCYTES NFR BLD AUTO: 16.9 % (ref 20.5–51.1)
MCH RBC QN AUTO: 28.4 PG (ref 27–31)
MCHC RBC AUTO-ENTMCNC: 32.6 G/DL (ref 33–37)
MCV RBC AUTO: 87.1 FL (ref 80–100)
MONOCYTES NFR BLD AUTO: 6.2 % (ref 1.7–9.3)
PLATELET # BLD AUTO: 228 K/UL (ref 140–450)
POTASSIUM SERPL-SCNC: 4.6 MMOL/L (ref 3.4–5.3)
PROT SERPL-MCNC: 7.3 G/DL (ref 6.4–8.3)
PTH-INTACT SERPL-MCNC: 39 PG/ML (ref 15–65)
RBC # BLD AUTO: 4.86 X10/CMM (ref 3.7–5.2)
SODIUM SERPL-SCNC: 140 MMOL/L (ref 135–145)
TRIGLYCERIDES (RMG): 141 MG/DL (ref 0–149)
URINE CREATININE - RMG: 10 (ref 0–300)
WBC # BLD AUTO: 9.2 X10/CMM (ref 3.8–11)

## 2024-08-28 PROCEDURE — 36415 COLL VENOUS BLD VENIPUNCTURE: CPT | Performed by: FAMILY MEDICINE

## 2024-08-28 PROCEDURE — 83970 ASSAY OF PARATHORMONE: CPT | Mod: ORL | Performed by: FAMILY MEDICINE

## 2024-08-28 PROCEDURE — 85025 COMPLETE CBC W/AUTO DIFF WBC: CPT | Performed by: FAMILY MEDICINE

## 2024-08-28 PROCEDURE — G2211 COMPLEX E/M VISIT ADD ON: HCPCS | Performed by: FAMILY MEDICINE

## 2024-08-28 PROCEDURE — 82044 UR ALBUMIN SEMIQUANTITATIVE: CPT | Performed by: FAMILY MEDICINE

## 2024-08-28 PROCEDURE — 80061 LIPID PANEL: CPT | Mod: QW | Performed by: FAMILY MEDICINE

## 2024-08-28 PROCEDURE — 82040 ASSAY OF SERUM ALBUMIN: CPT | Mod: ORL | Performed by: FAMILY MEDICINE

## 2024-08-28 PROCEDURE — 99214 OFFICE O/P EST MOD 30 MIN: CPT | Performed by: FAMILY MEDICINE

## 2024-08-28 RX ORDER — LISINOPRIL 10 MG/1
TABLET ORAL
Qty: 45 TABLET | Refills: 0 | Status: SHIPPED | OUTPATIENT
Start: 2024-08-28 | End: 2024-09-09

## 2024-08-28 NOTE — PROGRESS NOTES
"Problem(s) Oriented visit        SUBJECTIVE:                                                    Shadia Gil is a 84 year old female who presents to clinic today for the following health issues :  Recheck Medication (Nonfasting/)  Here to check up on htn  Pacemakedr  Morbid obesity  Ckd 3b with Vit d for hyperparathyroidism  Atherosclerosis of ab      Problem list, Medication list, Allergies, and Medical/Social/Surgical histories reviewed in EPIC and updated as appropriate.   Additional history: as documented    ROS:  General and CV completed and negative except as noted above    Histories: reviewed    OBJECTIVE:                                                    BP (!) 168/81 (BP Location: Right arm, Patient Position: Sitting, Cuff Size: Adult Large)   Pulse 60   Ht 1.626 m (5' 4\")   Wt 104.7 kg (230 lb 12.8 oz)   SpO2 97%   BMI 39.62 kg/m    Body mass index is 39.62 kg/m .   Thyroid = Not enlarged and no masses felt  Resp effort = Calm regular breathing  Breath Sounds = Good air movement with no rales or rhonchi in any lung fields  Heart Rate, Rhythm, & sounds (no Murm)  = Regular rate and rhythm with no S3, S4, or murmur appreciated.  Abdomen = Soft, nontender, no masses, & bowel sounds in all quadrants  Digits and Nails = FROM in all finger joints, no nail dystrophy  Ext (edema) = No pretibial edema noted or elsewhere     ASSESSMENT/PLAN:                                                    Quality gaps reviewed    Shadia was seen today for recheck medication.    Diagnoses and all orders for this visit:    Atherosclerosis of aorta (H24)  We will continue to aggressively manage secondary risk factors, including aggressive BP control (under 130/ideally), aggressive LDL lowering with statin (goal under 100 for sure/under 70 ideally), no smoking, diabetes prevention/management, no smoking, and use of either ASA or similar anti platelet agent if tolerated.     -     Comprehensive metabolic panel; " Future  -     Lipid Profile (R[facility])    Essential hypertension, benign  Reviewed her current HTN management. Discussed our goal for her is a systolic pressure at or below 128 and diastolic pressure at or below 83.  We today managed her prescriptions with refills ensured to ensure availabilty of current medications.  Discussed the importance for aggressive management of HTN to prevent vascular complications later.  Recommended lower fat, lower carbohydrate, and lower sodium (<2000 mg)diet. Required intervals for follow up on HTN, lab studies reviewed.    Strongly recommened she follow her blood pressures outside the clinic to ensure that BPs are remaining within guidelines,.  Instructed to contact me if the readings are not within guidelines on a regular basis so we can adjust treatment as needed.    -     lisinopril (ZESTRIL) 10 MG tablet; Take 2 tablets (20 mg) by mouth every morning AND 1 tablet (10 mg) every evening. LAST REFILL WITHOUT A FOLLOW UP APPOINTMENT.    Tachycardia-bradycardia (H)  Pacer working well    -     Microalbumin (RMG)    Severe obesity (BMI 35.0-35.9 with comorbidity) (H)  Obesity is contributing to htn.  Current BMI is: Body mass index is 39.62 kg/m ..  Reviewed weight patterns.   Obesity as a biological preventable and treatable disease, which is associated with significantly increased risk of many acute and chronic health conditions.   Obesity has now been recognized as a chronic disease by the American Medical Association.  Obesity has serious co-morbidities associated with obesity including increased risk for hypertension, stroke, coronary artery disease, dyslipidemia, Type II diabetes, depression, sleep apnea, cancers of the colon, breast and endometrium, obstructive sleep apnea, osteoarthritis and female infertility.  Recommended regular aerobic exercise, recommended improved diet aiming at lowering amount of processed foods, lower sugars, moderation/reduction of simple carbs and fat in  the diet, establishing more regular meal times, always eating breakfast, front loading some of the calories and adding more protein to the diet.     Referral to Weight Loss Clinic for discussion of more aggressive measures for weight loss can be considered (including medical options (e.g. GLP-1, or appetite suppressants, etc.) or bariatric surgical procedures.    -     CBC with Diff/Plt (RMG)  -     Parathyroid Hormone Intact; Future    Stage 3b chronic kidney disease (H)  Chronic kidney disease due to HTN and DM.  Check urine for protein.   Patient is on ACE/ARB.  Patient is on a statin.  Told the patient to avoid NSAIDs if at all possible.   Will monitor closely and send to Nephrologist if renal function continues to decline.    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   They do not report any relative changes in their ability to perform physical activities over the past year.    We discussed aggressive secondary risk factor modification, including aggressive BP control (under 130/ideally), aggressive LDL lowering with statin (goal under 100 for sure/under 70 ideally), no smoking, diabetes prevention/management, no smoking, and use of either ASA or similar anti platelet agent if tolerated.      -     Lipid Profile (RMG)        Work on weight loss  Regular exercise    Health maintenance items are reviewed in Epic and correct as of today:    Salvador Jose MD  [facility]  Family Practice  [facility]  [phone]    For any issues my office # is [phone]      The longitudinal plan of care for the  diagnosis(es)/condition(s) as documented were addressed during this visit. Due to the added complexity in care, I will continue to support Shadia in the subsequent management and with ongoing continuity of care.

## 2024-08-28 NOTE — LETTER
September 3, 2024      Shadia Gil  [id number]    [facility] [address]              Dear Shadia,     I am writing to report that your included test results are as expected.    Many labs contain some results that are slightly outside of the normal range, I have reviewed any of these results and they require no changes at this time.     Resulted Orders   Microalbumin ([facility])   Result Value Ref Range    Albumin mg/L 30 (A) 0 - 10    Urine Creatinine Mg/Dl 10 0 - 300    A/C Ratio mg/g  (A)     Interpretation Abnormal (A)    CBC with Diff/Plt ([facility])   Result Value Ref Range    WBC x10/cmm 9.2 3.8 - 11.0 x10/cmm    % Lymphocytes 16.9 (A) 20.5 - 51.1 %    % Monocytes 6.2 1.7 - 9.3 %    % Granulocytes 76.9 (A) 42.2 - 75.2 %    RBC x10/cmm 4.86 3.7 - 5.2 x10/cmm    Hemoglobin 13.8 11.8 - 15.5 g/dl    Hematocrit 42.3 35 - 46 %    MCV 87.1 80 - 100 fL    MCH 28.4 27.0 - 31.0 pg    MCHC 32.6 (A) 33.0 - 37.0 g/dL    Platelet Count 228 140 - 450 K/uL   Lipid Profile ([facility])   Result Value Ref Range    Cholesterol 163 100 - 199 mg/dL    HDL 56 40 mg/dL    Triglycerides 141 0 - 149 mg/dL    LDL CALCULATED ([facility]) 79 0 - 130 mg/dL    Patient Fasting? No    Comprehensive metabolic panel   Result Value Ref Range    Sodium 140 135 - 145 mmol/L    Potassium 4.6 3.4 - 5.3 mmol/L    Carbon Dioxide (CO2) 24 22 - 29 mmol/L    Anion Gap 13 7 - 15 mmol/L    Urea Nitrogen 22.4 8.0 - 23.0 mg/dL    Creatinine 1.08 (H) 0.51 - 0.95 mg/dL    GFR Estimate 50 (L) >60 mL/min/1.73m2      Comment:      eGFR calculated using 2021 CKD-EPI equation.    Calcium 9.4 8.8 - 10.4 mg/dL      Comment:      Reference intervals for this test were updated on 7/16/2024 to reflect our healthy population more accurately. There may be differences in the flagging of prior results with similar values performed with this method. Those prior results can be interpreted in the context of the updated reference intervals.    Chloride 103 28 - 107  mmol/L    Glucose 87 70 - 99 mg/dL    Alkaline Phosphatase 68 40 - 150 U/L    AST 23 0 - 45 U/L    ALT 15 0 - 50 U/L    Protein Total 7.3 6.4 - 8.3 g/dL    Albumin 4.2 3.5 - 5.2 g/dL    Bilirubin Total 0.5 <=1.2 mg/dL    Patient Fasting > 8hrs? Unknown    Parathyroid Hormone Intact   Result Value Ref Range    Parathyroid Hormone Intact 39 15 - 65 pg/mL    Narrative    This result was obtained with the Roche Elecsys PTH STAT assay.   This reference range differs from PTH assays used in other [facility] laboratories.       If you have any questions or concerns, please call the clinic at the number listed above.       Sincerely,      Salvador Jose MD

## 2024-09-07 DIAGNOSIS — I10 ESSENTIAL HYPERTENSION, BENIGN: ICD-10-CM

## 2024-09-09 RX ORDER — LISINOPRIL 10 MG/1
TABLET ORAL
Qty: 270 TABLET | Refills: 1 | Status: SHIPPED | OUTPATIENT
Start: 2024-09-09

## 2024-09-09 NOTE — TELEPHONE ENCOUNTER
Med: Lisinopril    LOV (related): 8/28/24 with Dr. Jose     Last Lab: 8/28/24      Due for F/U around: due for awv    Next Appt: none    BP Readings from Last 3 Encounters:   08/28/24 (!) 168/81   08/08/23 (!) 81/61   02/25/20 138/78      Last Comprehensive Metabolic Panel:  Lab Results   Component Value Date     08/28/2024    POTASSIUM 4.6 08/28/2024    CHLORIDE 103 08/28/2024    CO2 24 08/28/2024    ANIONGAP 13 08/28/2024    GLC 87 08/28/2024    BUN 22.4 08/28/2024    CR 1.08 (H) 08/28/2024    GFRESTIMATED 50 (L) 08/28/2024    MILTON 9.4 08/28/2024

## 2025-02-10 DIAGNOSIS — I10 ESSENTIAL HYPERTENSION, BENIGN: ICD-10-CM

## 2025-02-10 NOTE — TELEPHONE ENCOUNTER
Med: metoprolol tartrate (LOPRESSOR) 50 MG tablet     LOV (related): 8/28/24    Last Lab: 8/28/24      Due for F/U around: LAST REFILL WITHOUT A FOLLOW UP APPOINTMENT.     Next Appt: none  Spoke with patient - transportation issues- will CB later         BP Readings from Last 3 Encounters:   08/28/24 (!) 168/81   08/08/23 (!) 81/61   02/25/20 138/78       Last Comprehensive Metabolic Panel:  Lab Results   Component Value Date     08/28/2024    POTASSIUM 4.6 08/28/2024    CHLORIDE 103 08/28/2024    CO2 24 08/28/2024    ANIONGAP 13 08/28/2024    GLC 87 08/28/2024    BUN 22.4 08/28/2024    CR 1.08 (H) 08/28/2024    GFRESTIMATED 50 (L) 08/28/2024    MILTON 9.4 08/28/2024

## 2025-02-11 RX ORDER — METOPROLOL TARTRATE 50 MG
50 TABLET ORAL 2 TIMES DAILY
Qty: 30 TABLET | Refills: 0 | Status: SHIPPED | OUTPATIENT
Start: 2025-02-11

## 2025-02-19 DIAGNOSIS — I25.10 CORONARY ARTERY DISEASE INVOLVING NATIVE CORONARY ARTERY OF NATIVE HEART WITHOUT ANGINA PECTORIS: ICD-10-CM

## 2025-02-19 DIAGNOSIS — E78.5 HYPERLIPIDEMIA LDL GOAL <100: ICD-10-CM

## 2025-02-19 RX ORDER — PRAVASTATIN SODIUM 80 MG/1
80 TABLET ORAL DAILY
Qty: 90 TABLET | Refills: 2 | Status: SHIPPED | OUTPATIENT
Start: 2025-02-19

## 2025-02-24 ENCOUNTER — OFFICE VISIT (OUTPATIENT)
Dept: FAMILY MEDICINE | Facility: CLINIC | Age: 85
End: 2025-02-24

## 2025-02-24 VITALS
BODY MASS INDEX: 37.11 KG/M2 | OXYGEN SATURATION: 96 % | HEART RATE: 60 BPM | DIASTOLIC BLOOD PRESSURE: 69 MMHG | WEIGHT: 216.2 LBS | SYSTOLIC BLOOD PRESSURE: 163 MMHG

## 2025-02-24 DIAGNOSIS — N18.30 DIABETES MELLITUS WITH STAGE 3 CHRONIC KIDNEY DISEASE (H): ICD-10-CM

## 2025-02-24 DIAGNOSIS — I10 ESSENTIAL HYPERTENSION, BENIGN: ICD-10-CM

## 2025-02-24 DIAGNOSIS — N18.31 STAGE 3A CHRONIC KIDNEY DISEASE (H): ICD-10-CM

## 2025-02-24 DIAGNOSIS — E11.22 DIABETES MELLITUS WITH STAGE 3 CHRONIC KIDNEY DISEASE (H): ICD-10-CM

## 2025-02-24 DIAGNOSIS — I49.5 TACHYCARDIA-BRADYCARDIA (H): ICD-10-CM

## 2025-02-24 DIAGNOSIS — Z23 NEED FOR VACCINATION: Primary | ICD-10-CM

## 2025-02-24 DIAGNOSIS — E66.01 SEVERE OBESITY (BMI 35.0-35.9 WITH COMORBIDITY) (H): ICD-10-CM

## 2025-02-24 LAB
% GRANULOCYTES: 73.3 % (ref 42.2–75.2)
ALBUMIN SERPL BCG-MCNC: 4.2 G/DL (ref 3.5–5.2)
ALP SERPL-CCNC: 69 U/L (ref 40–150)
ALT SERPL W P-5'-P-CCNC: 12 U/L (ref 0–50)
ANION GAP SERPL CALCULATED.3IONS-SCNC: 11 MMOL/L (ref 7–15)
AST SERPL W P-5'-P-CCNC: 20 U/L (ref 0–45)
BILIRUB SERPL-MCNC: 0.4 MG/DL
BUN SERPL-MCNC: 38.2 MG/DL (ref 8–23)
CALCIUM SERPL-MCNC: 9.7 MG/DL (ref 8.8–10.4)
CHLORIDE SERPL-SCNC: 104 MMOL/L (ref 98–107)
CHOLESTEROL: 172 MG/DL (ref 100–199)
CREAT SERPL-MCNC: 1.42 MG/DL (ref 0.51–0.95)
EGFRCR SERPLBLD CKD-EPI 2021: 36 ML/MIN/1.73M2
FASTING STATUS PATIENT QL REPORTED: ABNORMAL
FASTING?: NO
GLUCOSE SERPL-MCNC: 114 MG/DL (ref 70–99)
HBA1C MFR BLD: 6.6 % (ref 4–6)
HCO3 SERPL-SCNC: 28 MMOL/L (ref 22–29)
HCT VFR BLD AUTO: 41.6 % (ref 35–46)
HDL (RMG): 30 MG/DL (ref 40–?)
HEMOGLOBIN: 13.8 G/DL (ref 11.8–15.5)
LDL CALCULATED (RMG): 98 MG/DL (ref 0–130)
LYMPHOCYTES NFR BLD AUTO: 19.6 % (ref 20.5–51.1)
MCH RBC QN AUTO: 28.6 PG (ref 27–31)
MCHC RBC AUTO-ENTMCNC: 33.1 G/DL (ref 33–37)
MCV RBC AUTO: 86.2 FL (ref 80–100)
MONOCYTES NFR BLD AUTO: 7.1 % (ref 1.7–9.3)
PLATELET # BLD AUTO: 247 K/UL (ref 140–450)
POTASSIUM SERPL-SCNC: 5.4 MMOL/L (ref 3.4–5.3)
PROT SERPL-MCNC: 7.4 G/DL (ref 6.4–8.3)
RBC # BLD AUTO: 4.83 X10/CMM (ref 3.7–5.2)
SODIUM SERPL-SCNC: 143 MMOL/L (ref 135–145)
TRIGLYCERIDES (RMG): 224 MG/DL (ref 0–149)
TSH SERPL DL<=0.005 MIU/L-ACNC: 2.74 UIU/ML (ref 0.3–4.2)
WBC # BLD AUTO: 8.9 X10/CMM (ref 3.8–11)

## 2025-02-24 PROCEDURE — 83036 HEMOGLOBIN GLYCOSYLATED A1C: CPT | Performed by: FAMILY MEDICINE

## 2025-02-24 PROCEDURE — 85025 COMPLETE CBC W/AUTO DIFF WBC: CPT | Performed by: FAMILY MEDICINE

## 2025-02-24 PROCEDURE — G0008 ADMIN INFLUENZA VIRUS VAC: HCPCS | Performed by: FAMILY MEDICINE

## 2025-02-24 PROCEDURE — 84155 ASSAY OF PROTEIN SERUM: CPT | Mod: ORL | Performed by: FAMILY MEDICINE

## 2025-02-24 PROCEDURE — 36415 COLL VENOUS BLD VENIPUNCTURE: CPT | Performed by: FAMILY MEDICINE

## 2025-02-24 PROCEDURE — 80061 LIPID PANEL: CPT | Mod: QW | Performed by: FAMILY MEDICINE

## 2025-02-24 PROCEDURE — 90653 IIV ADJUVANT VACCINE IM: CPT | Performed by: FAMILY MEDICINE

## 2025-02-24 PROCEDURE — 99214 OFFICE O/P EST MOD 30 MIN: CPT | Mod: 25 | Performed by: FAMILY MEDICINE

## 2025-02-24 PROCEDURE — 82040 ASSAY OF SERUM ALBUMIN: CPT | Mod: ORL | Performed by: FAMILY MEDICINE

## 2025-02-24 PROCEDURE — 84443 ASSAY THYROID STIM HORMONE: CPT | Mod: ORL | Performed by: FAMILY MEDICINE

## 2025-02-24 RX ORDER — LISINOPRIL 10 MG/1
TABLET ORAL
Qty: 270 TABLET | Refills: 1 | Status: SHIPPED | OUTPATIENT
Start: 2025-02-24 | End: 2025-02-24

## 2025-02-24 RX ORDER — METOPROLOL TARTRATE 50 MG
50 TABLET ORAL 2 TIMES DAILY
Qty: 180 TABLET | Refills: 3 | Status: SHIPPED | OUTPATIENT
Start: 2025-02-24

## 2025-02-24 RX ORDER — LISINOPRIL 10 MG/1
TABLET ORAL
Qty: 270 TABLET | Refills: 3 | Status: SHIPPED | OUTPATIENT
Start: 2025-02-24

## 2025-02-24 RX ORDER — METOPROLOL TARTRATE 50 MG
50 TABLET ORAL 2 TIMES DAILY
Qty: 30 TABLET | Refills: 0 | Status: SHIPPED | OUTPATIENT
Start: 2025-02-24 | End: 2025-02-24

## 2025-02-24 NOTE — PROGRESS NOTES
Here to check up on:  CAD, quad bypass in 2010    Ckd3    DM has been very well controlled with diet over the years.  ROS:  General and Resp. completed and negative except as noted above    Histories: reviewed    OBJECTIVE:                                                    BP (!) 163/69   Pulse 60   Wt 98.1 kg (216 lb 3.2 oz)   SpO2 96%   BMI 37.11 kg/m    Body mass index is 37.11 kg/m .   APPEARANCE: = Relaxed and in no distress  Conj/Eyelids = noninjected and lids and lashes are without inflammation  PERRLA/Irises = Pupils Round Reactive to Light and Irisis without inflammation  Ear canals and TM's = Canals are not inflammed and have none or little wax and the drums are not injected and have a light reflex   Neck = No anterior or posterior adenopathy appreciated.  Thyroid = Not enlarged and no masses felt  Resp effort = Calm regular breathing  Breath Sounds = Good air movement with no rales or rhonchi in any lung fields  Heart Rate, Rhythm, & sounds (no Murm)  = Regular rate and rhythm with no S3, S4, or murmur appreciated.  Carotid Art's = Pulses full and equal and no bruits appreciated  Abdomen = Soft, nontender, no masses, & bowel sounds in all quadrants  Liver/Spleen = Normal span and no splenomegaly noted  Digits and Nails = FROM in all finger joints, no nail dystrophy  Ext (edema) = No pretibial edema noted or elsewhere  Musculsktl =  Strength and ROM of major joints are within normal limits  Foot exam = Monofilament exam shows good sensation throughout bilaterally  SKIN = absent significant rashes or lesions   Recent/Remote Memory = Alert and Oriented x 3  Mood/Affect = Cooperative and interested     ASSESSMENT/PLAN:                                                    Quality gaps reviewed    Shadia was seen today for labs.    Diagnoses and all orders for this visit:    Need for vaccination  -     INFLUENZA VACCINE, TRIVALENT 65+ (FLUAD)    Tachycardia-bradycardia (H)  Pacer in place    Severe obesity  (BMI 35.0-35.9 with comorbidity) (H)  Continue to try and lose weight  Stage 3a chronic kidney disease (H)  Chronic kidney disease level 3a (eGFR of 45-59).  Check urine for protein as per routine.   Patient is on ACE/ARB.  Patient is on a statin.  Told the patient to avoid NSAIDs if at all possible.   Will monitor closely and send to Nephrologist if renal function continues to decline.      Essential hypertension, benign  Reviewed her current HTN management. Discussed our goal for her is a systolic pressure at or below 128 and diastolic pressure at or below 83.  We today managed her prescriptions with refills ensured to ensure availabilty of current medications.  Discussed the importance for aggressive management of HTN to prevent vascular complications later.  Recommended lower fat, lower carbohydrate, and lower sodium (<2000 mg)diet. Required intervals for follow up on HTN, lab studies reviewed.    Strongly recommened she follow her blood pressures outside the clinic to ensure that BPs are remaining within guidelines,.  Instructed to contact me if the readings are not within guidelines on a regular basis so we can adjust treatment as needed.    -     metoprolol tartrate (LOPRESSOR) 50 MG tablet; Take 1 tablet (50 mg) by mouth 2 times daily.  -     lisinopril (ZESTRIL) 10 MG tablet; Take 2 tablets (20 mg) by mouth every morning AND 1 tablet (10 mg) every evening.  -     CBC with Diff/Plt ([facility])  -     Comprehensive metabolic panel; Future    Diabetes mellitus with stage 3 chronic kidney disease (H)  Diet controlled.  Recheck levels.  -     Hemoglobin A1C ([facility])  -     Lipid Profile ([facility])  -     TSH; Future  Work on weight loss  Regular exercise    Health maintenance items are reviewed in Epic and correct as of today:    Salvadro Jose MD  [facility]  Family Practice  [facility]  [phone]    For any issues my office # is [phone]        Answers submitted by the patient for this  visit:  General Questionnaire (Submitted on 2/24/2025)  Chief Complaint: Chronic problems general questions HPI Form  What is the reason for your visit today? : labs  How many days per week do you miss taking your medication?: 0  Questionnaire about: Chronic problems general questions HPI Form (Submitted on 2/24/2025)  Chief Complaint: Chronic problems general questions HPI Form

## 2025-03-13 DIAGNOSIS — E78.5 HYPERLIPIDEMIA LDL GOAL <100: ICD-10-CM

## 2025-03-13 DIAGNOSIS — I25.10 CORONARY ARTERY DISEASE INVOLVING NATIVE CORONARY ARTERY OF NATIVE HEART WITHOUT ANGINA PECTORIS: ICD-10-CM

## 2025-03-13 RX ORDER — PRAVASTATIN SODIUM 80 MG/1
80 TABLET ORAL DAILY
Qty: 90 TABLET | Refills: 2 | Status: SHIPPED | OUTPATIENT
Start: 2025-03-13

## 2025-07-28 ENCOUNTER — TELEPHONE (OUTPATIENT)
Dept: FAMILY MEDICINE | Facility: CLINIC | Age: 85
End: 2025-07-28